# Patient Record
Sex: MALE | Race: WHITE | NOT HISPANIC OR LATINO | Employment: FULL TIME | URBAN - METROPOLITAN AREA
[De-identification: names, ages, dates, MRNs, and addresses within clinical notes are randomized per-mention and may not be internally consistent; named-entity substitution may affect disease eponyms.]

---

## 2018-12-14 ENCOUNTER — OFFICE VISIT (OUTPATIENT)
Dept: FAMILY MEDICINE CLINIC | Facility: CLINIC | Age: 23
End: 2018-12-14
Payer: COMMERCIAL

## 2018-12-14 VITALS
HEART RATE: 76 BPM | WEIGHT: 165 LBS | BODY MASS INDEX: 25.01 KG/M2 | SYSTOLIC BLOOD PRESSURE: 112 MMHG | RESPIRATION RATE: 16 BRPM | DIASTOLIC BLOOD PRESSURE: 70 MMHG | OXYGEN SATURATION: 98 % | HEIGHT: 68 IN

## 2018-12-14 DIAGNOSIS — Z00.01 ENCOUNTER FOR ROUTINE ADULT MEDICAL EXAM WITH ABNORMAL FINDINGS: Primary | ICD-10-CM

## 2018-12-14 DIAGNOSIS — Z13.1 ENCOUNTER FOR SCREENING FOR DIABETES MELLITUS: ICD-10-CM

## 2018-12-14 DIAGNOSIS — Z13.0 SCREENING FOR IRON DEFICIENCY ANEMIA: ICD-10-CM

## 2018-12-14 DIAGNOSIS — Z23 NEED FOR INFLUENZA VACCINATION: ICD-10-CM

## 2018-12-14 DIAGNOSIS — Z13.29 SCREENING FOR THYROID DISORDER: ICD-10-CM

## 2018-12-14 DIAGNOSIS — E66.3 OVERWEIGHT (BMI 25.0-29.9): ICD-10-CM

## 2018-12-14 DIAGNOSIS — Z13.220 ENCOUNTER FOR SCREENING FOR LIPID DISORDER: ICD-10-CM

## 2018-12-14 PROCEDURE — 99395 PREV VISIT EST AGE 18-39: CPT | Performed by: FAMILY MEDICINE

## 2018-12-14 NOTE — PROGRESS NOTES
[facility] HEALTH MAINTENANCE OFFICE VISIT  [facility] [place] [facility]    NAME: Olga Irizarry  AGE: 24 y o  SEX: male  : 1995     DATE: 12/15/2018    Assessment and Plan     Problem List Items Addressed This Visit     None      Visit Diagnoses     Encounter for routine adult medical exam with abnormal findings    -  Primary    Need for influenza vaccination        Relevant Orders    SYRINGE/SINGLE-DOSE VIAL: influenza vaccine, [id number], quadrivalent, 0 5 mL, preservative-free, for patients 3+ yr (FLUZONE)    Overweight (BMI 25 0-29  9)        Encounter for screening for lipid disorder        Relevant Orders    CBC and differential    Comprehensive metabolic panel    Lipid panel    TSH, 3rd generation with Free T4 reflex    Encounter for screening for diabetes mellitus        Relevant Orders    CBC and differential    Comprehensive metabolic panel    Lipid panel    TSH, 3rd generation with Free T4 reflex    Screening for thyroid disorder        Relevant Orders    CBC and differential    Comprehensive metabolic panel    Lipid panel    TSH, 3rd generation with Free T4 reflex    Screening for iron deficiency anemia        Relevant Orders    CBC and differential    Comprehensive metabolic panel    Lipid panel    TSH, 3rd generation with Free T4 reflex            · Patient Counseling:   · Nutrition: Stressed importance of a well balanced diet, moderation of sodium/saturated fat, caloric balance and sufficient intake of fiber  · Exercise: Stressed the importance of regular exercise with a goal of 150 minutes per week  · Dental Health: Discussed daily flossing and brushing and regular dental visits     · Immunizations reviewed the patient declined flu shot for today  · Discussed benefits of screening The STD screening has been discussed with the patient and he declined for today also we discussed screening for hyperlipidemia and screening for diabetic and patient declined for today  BMI Counseling: Body mass index is 25 46 kg/m²  Discussed with patient's BMI with him  Chief Complaint     Chief Complaint   Patient presents with    Physical Exam     yearly        History of Present Illness     Patient and office for his annual physical exam he deny any chest pain short of breath no palpitation no headache no blurred vision no weakness or lateralized of the symptom no abdomen pain nausea vomiting or diarrhea no renal problem no rash no fever no change in the weight and no change in the mood patient deny smoking he does not do any special diet and he does do exercise frequently the patient sexually active with partner sometimes without the condom he deny any dysuria and no discharge we discussed the STD testing patient will hold on that the for today patient had history of a tear in his right shoulder for what he been followed by orthopedic        Well Adult Physical   Patient here for a comprehensive physical exam       Diet and Physical Activity  Diet:  Regular diet  Weight concerns: Patient is overweight (BMI 25 0-29  9)  Exercise: frequently      Depression Screen  PHQ-9 Depression Screening    PHQ-9:    Frequency of the following problems over the past two weeks:       Little interest or pleasure in doing things:  0 - not at all  Feeling down, depressed, or hopeless:  0 - not at all  PHQ-2 Score:  0          General Health  Hearing: Normal:  bilateral  Vision: no vision problems  Dental: regular dental visits      The following portions of the patient's history were reviewed and updated as appropriate: allergies, current medications, past family history, past medical history, past social history, past surgical history and problem list     Review of Systems     Review of Systems   Constitutional: Negative for fatigue and fever  HENT: Negative for ear pain, sinus pain, sinus pressure and sore throat  Eyes: Negative for pain and redness     Respiratory: Negative for cough, chest tightness and shortness of breath  Cardiovascular: Negative for chest pain, palpitations and leg swelling  Gastrointestinal: Negative for abdominal pain, blood in stool, constipation, diarrhea and nausea  Genitourinary: Negative for flank pain, frequency and hematuria  Musculoskeletal: Negative for back pain and joint swelling  Skin: Negative for rash  Neurological: Negative for dizziness, numbness and headaches  Hematological: Does not bruise/bleed easily  Psychiatric/Behavioral: Negative for agitation and behavioral problems  Past Medical History     History reviewed  No pertinent past medical history  Past Surgical History     History reviewed  No pertinent surgical history  Social History     Social History     Social History    Marital status: Single     Spouse name: N/A    Number of children: 0    Years of education: N/A     Social History Main Topics    Smoking status: Never Smoker    Smokeless tobacco: Never Used    Alcohol use Yes      Comment: occasional     Drug use: No    Sexual activity: Yes     Other Topics Concern    None     Social History Narrative    Hand dominance: R       Family History     History reviewed  No pertinent family history  Current Medications     No current outpatient prescriptions on file  Allergies     No Known Allergies    Objective     /70 (BP Location: Left arm, Patient Position: Sitting, Cuff Size: Large)   Pulse 76   Resp 16   Ht 5' 7 5" (1 715 m)   Wt 74 8 kg (165 lb)   SpO2 98%   BMI 25 46 kg/m²      Physical Exam   Constitutional: He is oriented to person, place, and time  He appears well-developed and well-nourished  HENT:   Head: Normocephalic  Right Ear: External ear normal    Left Ear: External ear normal    Eyes: Conjunctivae and EOM are normal  Right eye exhibits no discharge  Left eye exhibits no discharge  Neck: No JVD present     Cardiovascular: Normal rate, regular rhythm and normal heart sounds  Exam reveals no gallop  No murmur heard  Pulmonary/Chest: Effort normal  No respiratory distress  He has no wheezes  He has no rales  He exhibits no tenderness  Abdominal: He exhibits no mass  There is no tenderness  There is no rebound  Musculoskeletal: He exhibits no edema or tenderness  Neurological: He is alert and oriented to person, place, and time  Skin: No rash noted  No erythema  Psychiatric: He has a normal mood and affect  His behavior is normal              Health Maintenance     Health Maintenance   Topic Date Due    INFLUENZA VACCINE  12/14/2019 (Originally 7/1/2018)    Depression Screening PHQ  12/14/2019    DTaP,Tdap,and Td Vaccines (2 - Td) 03/14/2026     Immunization History   Administered Date(s) Administered    Tdap 03/14/2016       Sallee Goldmann, MD  [place] [facility]    BMI Counseling: Body mass index is 25 46 kg/m²  Discussed the patient's BMI with him  The BMI is above average  BMI counseling and education was provided to the patient  Nutrition recommendations include reducing portion sizes, decreasing overall calorie intake, 3-5 servings of fruits/vegetables daily, reducing fast food intake, consuming healthier snacks, decreasing soda and/or juice intake and reducing intake of cholesterol  Exercise recommendations include exercising 3-5 times per week

## 2018-12-14 NOTE — PATIENT INSTRUCTIONS

## 2019-02-12 LAB
ALBUMIN SERPL-MCNC: 4.9 G/DL (ref 3.6–5.1)
ALBUMIN/GLOB SERPL: 2 (CALC) (ref 1–2.5)
ALP SERPL-CCNC: 79 U/L (ref 40–115)
ALT SERPL-CCNC: 33 U/L (ref 9–46)
AST SERPL-CCNC: 22 U/L (ref 10–40)
BASOPHILS # BLD AUTO: 49 CELLS/UL (ref 0–200)
BASOPHILS NFR BLD AUTO: 0.7 %
BILIRUB SERPL-MCNC: 0.5 MG/DL (ref 0.2–1.2)
BUN SERPL-MCNC: 15 MG/DL (ref 7–25)
BUN/CREAT SERPL: NORMAL (CALC) (ref 6–22)
CALCIUM SERPL-MCNC: 10 MG/DL (ref 8.6–10.3)
CHLORIDE SERPL-SCNC: 101 MMOL/L (ref 98–110)
CHOLEST SERPL-MCNC: 192 MG/DL
CHOLEST/HDLC SERPL: 3 (CALC)
CO2 SERPL-SCNC: 28 MMOL/L (ref 20–32)
CREAT SERPL-MCNC: 0.95 MG/DL (ref 0.6–1.35)
EOSINOPHIL # BLD AUTO: 119 CELLS/UL (ref 15–500)
EOSINOPHIL NFR BLD AUTO: 1.7 %
ERYTHROCYTE [DISTWIDTH] IN BLOOD BY AUTOMATED COUNT: 12.7 % (ref 11–15)
GLOBULIN SER CALC-MCNC: 2.4 G/DL (CALC) (ref 1.9–3.7)
GLUCOSE SERPL-MCNC: 88 MG/DL (ref 65–99)
HCT VFR BLD AUTO: 47 % (ref 38.5–50)
HDLC SERPL-MCNC: 65 MG/DL
HGB BLD-MCNC: 15.4 G/DL (ref 13.2–17.1)
LDLC SERPL CALC-MCNC: 113 MG/DL (CALC)
LYMPHOCYTES # BLD AUTO: 2240 CELLS/UL (ref 850–3900)
LYMPHOCYTES NFR BLD AUTO: 32 %
MCH RBC QN AUTO: 26.7 PG (ref 27–33)
MCHC RBC AUTO-ENTMCNC: 32.8 G/DL (ref 32–36)
MCV RBC AUTO: 81.6 FL (ref 80–100)
MONOCYTES # BLD AUTO: 805 CELLS/UL (ref 200–950)
MONOCYTES NFR BLD AUTO: 11.5 %
NEUTROPHILS # BLD AUTO: 3787 CELLS/UL (ref 1500–7800)
NEUTROPHILS NFR BLD AUTO: 54.1 %
NONHDLC SERPL-MCNC: 127 MG/DL (CALC)
PLATELET # BLD AUTO: 325 THOUSAND/UL (ref 140–400)
PMV BLD REES-ECKER: 10.1 FL (ref 7.5–12.5)
POTASSIUM SERPL-SCNC: 4.5 MMOL/L (ref 3.5–5.3)
PROT SERPL-MCNC: 7.3 G/DL (ref 6.1–8.1)
RBC # BLD AUTO: 5.76 MILLION/UL (ref 4.2–5.8)
SL AMB EGFR AFRICAN AMERICAN: 129 ML/MIN/1.73M2
SL AMB EGFR NON AFRICAN AMERICAN: 112 ML/MIN/1.73M2
SODIUM SERPL-SCNC: 138 MMOL/L (ref 135–146)
TRIGL SERPL-MCNC: 49 MG/DL
TSH SERPL-ACNC: 1.92 MIU/L (ref 0.4–4.5)
WBC # BLD AUTO: 7 THOUSAND/UL (ref 3.8–10.8)

## 2019-12-20 ENCOUNTER — OFFICE VISIT (OUTPATIENT)
Dept: FAMILY MEDICINE CLINIC | Facility: CLINIC | Age: 24
End: 2019-12-20
Payer: COMMERCIAL

## 2019-12-20 VITALS
TEMPERATURE: 99.4 F | WEIGHT: 166 LBS | OXYGEN SATURATION: 98 % | DIASTOLIC BLOOD PRESSURE: 60 MMHG | BODY MASS INDEX: 26.06 KG/M2 | SYSTOLIC BLOOD PRESSURE: 122 MMHG | HEIGHT: 67 IN | HEART RATE: 64 BPM

## 2019-12-20 DIAGNOSIS — Z00.01 ENCOUNTER FOR WELL ADULT EXAM WITH ABNORMAL FINDINGS: Primary | ICD-10-CM

## 2019-12-20 PROCEDURE — 99395 PREV VISIT EST AGE 18-39: CPT | Performed by: FAMILY MEDICINE

## 2019-12-20 NOTE — PROGRESS NOTES
FAMILY PRACTICE HEALTH MAINTENANCE OFFICE VISIT  [facility] [place] [facility] [place] [facility]    NAME: Howard Fu  AGE: 21 y o  SEX: male  : 1995     DATE: 2019    Assessment and Plan     1  Encounter for well adult exam with abnormal findings  Assessment & Plan:  Advice and education were given regarding nutrition, aerobic exercises, weight bearing exercises, cardiovascular risk reduction, fall risk reduction, and age appropriate supplements  The patient was counseled regarding instructions for management, risk factor reductions, prognosis, risks and benefits of treatment options, patient and family education, and importance of compliance with treatment  Discussed HIV screening per patient already had 6 years ago was negative no record in the chart we discussed the patient flu shot and he declined for today      2  BMI 26 0-26 9,adult  Assessment & Plan:  The BMI is above average  BMI counseling and education was provided to the patient  Nutrition recommendations include reducing portion sizes, decreasing overall calorie intake, 3-5 servings of fruits/vegetables daily, reducing fast food intake, consuming healthier snacks, decreasing soda and/or juice intake, moderation in carbohydrate intake and reducing intake of saturated fat and trans fat  Exercise recommendations include moderate aerobic physical activity for 150 minutes/week, exercising 3-5 times per week and joining a gym  · Patient Counseling:   · Nutrition: Stressed importance of a well balanced diet, moderation of sodium/saturated fat, caloric balance and sufficient intake of fiber  · Exercise: Stressed the importance of regular exercise with a goal of 150 minutes per week  · Dental Health: Discussed daily flossing and brushing and regular dental visits     · Immunizations reviewed: Declined recommended vaccinations  · Discussed benefits of:  Screening labs   BMI Counseling:  Body mass index is 26 kg/m²  Discussed with patient's BMI with him  Chief Complaint     Chief Complaint   Patient presents with    Physical Exam     PT is here for a physical        History of Present Illness     Patient here for annual physical exam he deny any chest pain short of breath no palpitation no cough no wheezing no hematosis deny any headache numbness weakness or lateralized of the symptom no abdomen pain nausea vomiting or diarrhea no renal problem no rash no fever no change in the weight and no change in the mood he does not smoker he does do exercise 3 times a week 45 minutes also he does not do any special diet      Well Adult Physical   Patient here for a comprehensive physical exam       Diet and Physical Activity  Diet: Regular diet  Exercise: frequently      Depression Screen  PHQ-9 Depression Screening    PHQ-9:    Frequency of the following problems over the past two weeks:       Little interest or pleasure in doing things:  0 - not at all  Feeling down, depressed, or hopeless:  0 - not at all  PHQ-2 Score:  0          General Health  Hearing: Normal:  bilateral  Vision: no vision problems  Dental: regular dental visits      The following portions of the patient's history were reviewed and updated as appropriate: allergies, current medications, past family history, past medical history, past social history, past surgical history and problem list     Review of Systems     Review of Systems   Constitutional: Negative for fatigue and fever  HENT: Negative for ear pain, sinus pressure, sinus pain and sore throat  Eyes: Negative for pain and redness  Respiratory: Negative for cough, chest tightness and shortness of breath  Cardiovascular: Negative for chest pain, palpitations and leg swelling  Gastrointestinal: Negative for abdominal pain, blood in stool, constipation, diarrhea and nausea  Endocrine: Negative for heat intolerance and polydipsia     Genitourinary: Negative for dysuria, flank pain, frequency and hematuria  Musculoskeletal: Negative for back pain and joint swelling  Skin: Negative for rash  Neurological: Negative for dizziness, numbness and headaches  Hematological: Does not bruise/bleed easily  Psychiatric/Behavioral: Negative for agitation and behavioral problems  Past Medical History     History reviewed  No pertinent past medical history  Past Surgical History     History reviewed  No pertinent surgical history  Social History     Social History     Socioeconomic History    Marital status: Single     Spouse name: None    Number of children: 0    Years of education: None    Highest education level: None   Occupational History    None   Social Needs    Financial resource strain: None    Food insecurity:     Worry: None     Inability: None    Transportation needs:     Medical: None     Non-medical: None   Tobacco Use    Smoking status: Never Smoker    Smokeless tobacco: Never Used   Substance and Sexual Activity    Alcohol use: Yes     Comment: occasional     Drug use: No    Sexual activity: Yes   Lifestyle    Physical activity:     Days per week: None     Minutes per session: None    Stress: None   Relationships    Social connections:     Talks on phone: None     Gets together: None     Attends [employer] service: None     Active member of club or organization: None     Attends meetings of clubs or organizations: None     Relationship status: None    Intimate partner violence:     Fear of current or ex partner: None     Emotionally abused: None     Physically abused: None     Forced sexual activity: None   Other Topics Concern    None   Social History Narrative    Hand dominance: R       Family History     History reviewed  No pertinent family history  Current Medications     No current outpatient medications on file       Allergies     No Known Allergies    Objective     /60   Pulse 64   Temp 99 4 °F (37 4 °C)   Ht 5' 7" (1 702 m) Wt 75 3 kg (166 lb)   SpO2 98%   BMI 26 00 kg/m²      Physical Exam   Constitutional: He is oriented to person, place, and time  He appears well-developed and well-nourished  HENT:   Head: Normocephalic  Right Ear: External ear normal    Left Ear: External ear normal    Eyes: Conjunctivae and EOM are normal  Right eye exhibits no discharge  Left eye exhibits no discharge  Neck: No JVD present  Cardiovascular: Normal rate, regular rhythm and normal heart sounds  Exam reveals no gallop  No murmur heard  Pulmonary/Chest: Effort normal  No respiratory distress  He has no wheezes  He has no rales  He exhibits no tenderness  Abdominal: He exhibits no mass  There is no tenderness  There is no rebound  Musculoskeletal: He exhibits no edema or tenderness  Neurological: He is alert and oriented to person, place, and time  Skin: No rash noted  No erythema  Psychiatric: He has a normal mood and affect  Marietta Salgado MD  [place] [facility]  BMI Counseling: Body mass index is 26 kg/m²  The BMI is above normal  Nutrition recommendations include reducing portion sizes, decreasing overall calorie intake and 3-5 servings of fruits/vegetables daily  Exercise recommendations include moderate aerobic physical activity for 150 minutes/week

## 2019-12-20 NOTE — PATIENT INSTRUCTIONS

## 2019-12-20 NOTE — ASSESSMENT & PLAN NOTE
Advice and education were given regarding nutrition, aerobic exercises, weight bearing exercises, cardiovascular risk reduction, fall risk reduction, and age appropriate supplements  The patient was counseled regarding instructions for management, risk factor reductions, prognosis, risks and benefits of treatment options, patient and family education, and importance of compliance with treatment       Discussed HIV screening per patient already had 6 years ago was negative no record in the chart we discussed the patient flu shot and he declined for today

## 2021-01-13 ENCOUNTER — TELEPHONE (OUTPATIENT)
Dept: FAMILY MEDICINE CLINIC | Facility: CLINIC | Age: 26
End: 2021-01-13

## 2022-10-28 ENCOUNTER — RA CDI HCC (OUTPATIENT)
Dept: OTHER | Facility: HOSPITAL | Age: 27
End: 2022-10-28

## 2022-10-28 NOTE — PROGRESS NOTES
[facility]  [facility]       Chart reviewed, no opportunity found: CHART REVIEWED, NO OPPORTUNITY FOUND        Patients Insurance        Commercial Insurance: [address] [place] [address]

## 2022-11-04 ENCOUNTER — OFFICE VISIT (OUTPATIENT)
Dept: INTERNAL MEDICINE CLINIC | Facility: CLINIC | Age: 27
End: 2022-11-04

## 2022-11-04 VITALS
TEMPERATURE: 96.7 F | HEART RATE: 62 BPM | SYSTOLIC BLOOD PRESSURE: 118 MMHG | WEIGHT: 164.4 LBS | DIASTOLIC BLOOD PRESSURE: 68 MMHG | HEIGHT: 68 IN | OXYGEN SATURATION: 100 % | BODY MASS INDEX: 24.92 KG/M2

## 2022-11-04 DIAGNOSIS — Z13.220 SCREENING FOR HYPERLIPIDEMIA: ICD-10-CM

## 2022-11-04 DIAGNOSIS — F90.9 ATTENTION DEFICIT HYPERACTIVITY DISORDER (ADHD), UNSPECIFIED ADHD TYPE: ICD-10-CM

## 2022-11-04 DIAGNOSIS — Z13.1 SCREENING FOR DIABETES MELLITUS: ICD-10-CM

## 2022-11-04 DIAGNOSIS — R01.1 HEART MURMUR: ICD-10-CM

## 2022-11-04 DIAGNOSIS — Z13.29 SCREENING FOR HYPOTHYROIDISM: ICD-10-CM

## 2022-11-04 DIAGNOSIS — Z13.0 SCREENING FOR DEFICIENCY ANEMIA: ICD-10-CM

## 2022-11-04 DIAGNOSIS — Z00.00 HEALTHCARE MAINTENANCE: Primary | ICD-10-CM

## 2022-11-04 PROBLEM — Z00.01 ENCOUNTER FOR WELL ADULT EXAM WITH ABNORMAL FINDINGS: Status: RESOLVED | Noted: 2019-12-20 | Resolved: 2022-11-04

## 2022-11-04 NOTE — ASSESSMENT & PLAN NOTE
Testing for possible attention deficit hyperactivity disorder has been requested will review results of neuro psychological evaluation with the patient upon completion

## 2022-11-04 NOTE — PROGRESS NOTES
Name: Sujey Prieto      : 1995      MRN: [id number]  Encounter Provider: Dalton Blood MD  Encounter Date: 2022   Encounter department: [facility]    Assessment & Plan     1  Heart murmur  Assessment & Plan:  Heart murmur is detected on auscultation of the heart today patient is unaware of any previous diagnosis of heart murmur a echocardiogram has been requested and will be reviewed with the patient upon completion  Orders:  -     Echo complete w/ contrast if indicated; Future; Expected date: 2022    2  Screening for hyperlipidemia  -     Lipid panel; Future    3  Screening for deficiency anemia  -     CBC and differential; Future    4  Screening for diabetes mellitus  -     Comprehensive metabolic panel; Future    5  Screening for hypothyroidism  -     TSH, 3rd generation with Free T4 reflex; Future    6  Attention deficit hyperactivity disorder (ADHD), unspecified ADHD type  Assessment & Plan:  Testing for possible attention deficit hyperactivity disorder has been requested will review results of neuro psychological evaluation with the patient upon completion  7  Healthcare maintenance  Assessment & Plan:  A newly detected heart murmur is noted on today's exam otherwise the examination is essentially normal   A request for CBC comprehensive metabolic profile thyroid screening and lipid profile have all been provided to the patient  There is a family history of thyroid disorder  BMI Counseling: Body mass index is 25 37 kg/m²  The BMI is above normal  Nutrition recommendations include decreasing portion sizes and moderation in carbohydrate intake  Exercise recommendations include exercising 3-5 times per week  Rationale for BMI follow-up plan is due to patient being overweight or obese  Depression Screening and Follow-up Plan: Patient was screened for depression during today's encounter   They screened negative with a PHQ-2 score of 0         Subjective      This pleasant 43-year-old gentleman presents today to establish care with our practice  He describes his health is generally good with no history of chronic medical pro problems  He has had no surgeries in the past   He is employed at [employer]  He exercises by playing basketball several times a week  He has an occasional cigar and drinks 4-5 alcoholic beverages weekly  He does question whether not he may have attention deficit he found that during school he had of more difficult time learning and retaining information and he does find that this continues to be the case even these days at work  Is never undergone any neurologic psychological testing  Review of Systems   Psychiatric/Behavioral: Positive for decreased concentration  All other systems reviewed and are negative  No current outpatient medications on file prior to visit  Objective     /68   Pulse 62   Temp (!) 96 7 °F (35 9 °C)   Ht 5' 7 5" (1 715 m)   Wt 74 6 kg (164 lb 6 4 oz)   SpO2 100%   BMI 25 37 kg/m²     Physical Exam  Constitutional:       General: He is not in acute distress  Appearance: He is well-developed  He is not ill-appearing  HENT:      Head: Normocephalic  Right Ear: Hearing, tympanic membrane, ear canal and external ear normal       Left Ear: Hearing, tympanic membrane, ear canal and external ear normal       Nose: Nose normal       Mouth/Throat:      Mouth: Mucous membranes are moist       Pharynx: Oropharynx is clear  Eyes:      General:         Right eye: No discharge  Left eye: No discharge  Extraocular Movements: Extraocular movements intact  Conjunctiva/sclera: Conjunctivae normal       Pupils: Pupils are equal, round, and reactive to light  Neck:      Thyroid: No thyromegaly  Vascular: No carotid bruit  Cardiovascular:      Rate and Rhythm: Normal rate and regular rhythm  Heart sounds: S1 normal and S2 normal  Murmur heard  Pulmonary:      Effort: Pulmonary effort is normal       Breath sounds: Normal breath sounds  No wheezing or rales  Chest:      Chest wall: No tenderness  Abdominal:      General: Bowel sounds are normal  There is no distension  Palpations: Abdomen is soft  There is no mass  Tenderness: There is no abdominal tenderness  There is no guarding  Hernia: No hernia is present  Genitourinary:     Penis: Normal        Testes: Normal       Comments: No evidence of inguinal hernias  Musculoskeletal:         General: No swelling or tenderness  Normal range of motion  Cervical back: Normal range of motion and neck supple  No rigidity  Right lower leg: No edema  Left lower leg: No edema  Lymphadenopathy:      Cervical: No cervical adenopathy  Skin:     General: Skin is warm and dry  Coloration: Skin is not jaundiced or pale  Neurological:      Mental Status: He is alert and oriented to person, place, and time  Deep Tendon Reflexes: Reflexes are normal and symmetric  Reflexes normal    Psychiatric:         Behavior: Behavior normal          Thought Content:  Thought content normal          Judgment: Judgment normal        Kenny Dorsey MD

## 2022-11-04 NOTE — ASSESSMENT & PLAN NOTE
A newly detected heart murmur is noted on today's exam otherwise the examination is essentially normal   A request for CBC comprehensive metabolic profile thyroid screening and lipid profile have all been provided to the patient  There is a family history of thyroid disorder

## 2022-11-04 NOTE — ASSESSMENT & PLAN NOTE
Heart murmur is detected on auscultation of the heart today patient is unaware of any previous diagnosis of heart murmur a echocardiogram has been requested and will be reviewed with the patient upon completion

## 2022-11-17 ENCOUNTER — APPOINTMENT (OUTPATIENT)
Dept: LAB | Age: 27
End: 2022-11-17

## 2022-11-17 DIAGNOSIS — Z13.29 SCREENING FOR HYPOTHYROIDISM: ICD-10-CM

## 2022-11-17 DIAGNOSIS — Z13.0 SCREENING FOR DEFICIENCY ANEMIA: ICD-10-CM

## 2022-11-17 DIAGNOSIS — Z13.1 SCREENING FOR DIABETES MELLITUS: ICD-10-CM

## 2022-11-17 DIAGNOSIS — Z13.220 SCREENING FOR HYPERLIPIDEMIA: ICD-10-CM

## 2022-11-17 LAB
ALBUMIN SERPL BCP-MCNC: 4.1 G/DL (ref 3.5–5)
ALP SERPL-CCNC: 70 U/L (ref 46–116)
ALT SERPL W P-5'-P-CCNC: 58 U/L (ref 12–78)
ANION GAP SERPL CALCULATED.3IONS-SCNC: 6 MMOL/L (ref 4–13)
AST SERPL W P-5'-P-CCNC: 24 U/L (ref 5–45)
BASOPHILS # BLD AUTO: 0.06 THOUSANDS/ÂΜL (ref 0–0.1)
BASOPHILS NFR BLD AUTO: 1 % (ref 0–1)
BILIRUB SERPL-MCNC: 0.71 MG/DL (ref 0.2–1)
BUN SERPL-MCNC: 20 MG/DL (ref 5–25)
CALCIUM SERPL-MCNC: 9.5 MG/DL (ref 8.3–10.1)
CHLORIDE SERPL-SCNC: 106 MMOL/L (ref 96–108)
CHOLEST SERPL-MCNC: 182 MG/DL
CO2 SERPL-SCNC: 27 MMOL/L (ref 21–32)
CREAT SERPL-MCNC: 1.05 MG/DL (ref 0.6–1.3)
EOSINOPHIL # BLD AUTO: 0.13 THOUSAND/ÂΜL (ref 0–0.61)
EOSINOPHIL NFR BLD AUTO: 2 % (ref 0–6)
ERYTHROCYTE [DISTWIDTH] IN BLOOD BY AUTOMATED COUNT: 13.7 % (ref 11.6–15.1)
GFR SERPL CREATININE-BSD FRML MDRD: 96 ML/MIN/1.73SQ M
GLUCOSE P FAST SERPL-MCNC: 97 MG/DL (ref 65–99)
HCT VFR BLD AUTO: 49.9 % (ref 36.5–49.3)
HDLC SERPL-MCNC: 61 MG/DL
HGB BLD-MCNC: 15.5 G/DL (ref 12–17)
IMM GRANULOCYTES # BLD AUTO: 0.02 THOUSAND/UL (ref 0–0.2)
IMM GRANULOCYTES NFR BLD AUTO: 0 % (ref 0–2)
LDLC SERPL CALC-MCNC: 110 MG/DL (ref 0–100)
LYMPHOCYTES # BLD AUTO: 3.31 THOUSANDS/ÂΜL (ref 0.6–4.47)
LYMPHOCYTES NFR BLD AUTO: 45 % (ref 14–44)
MCH RBC QN AUTO: 26.9 PG (ref 26.8–34.3)
MCHC RBC AUTO-ENTMCNC: 31.1 G/DL (ref 31.4–37.4)
MCV RBC AUTO: 87 FL (ref 82–98)
MONOCYTES # BLD AUTO: 0.74 THOUSAND/ÂΜL (ref 0.17–1.22)
MONOCYTES NFR BLD AUTO: 10 % (ref 4–12)
NEUTROPHILS # BLD AUTO: 3.13 THOUSANDS/ÂΜL (ref 1.85–7.62)
NEUTS SEG NFR BLD AUTO: 42 % (ref 43–75)
NONHDLC SERPL-MCNC: 121 MG/DL
NRBC BLD AUTO-RTO: 0 /100 WBCS
PLATELET # BLD AUTO: 315 THOUSANDS/UL (ref 149–390)
PMV BLD AUTO: 9.4 FL (ref 8.9–12.7)
POTASSIUM SERPL-SCNC: 4.2 MMOL/L (ref 3.5–5.3)
PROT SERPL-MCNC: 8.2 G/DL (ref 6.4–8.4)
RBC # BLD AUTO: 5.76 MILLION/UL (ref 3.88–5.62)
SODIUM SERPL-SCNC: 139 MMOL/L (ref 135–147)
TRIGL SERPL-MCNC: 57 MG/DL
TSH SERPL DL<=0.05 MIU/L-ACNC: 2.67 UIU/ML (ref 0.45–4.5)
WBC # BLD AUTO: 7.39 THOUSAND/UL (ref 4.31–10.16)

## 2022-11-23 ENCOUNTER — OFFICE VISIT (OUTPATIENT)
Dept: INTERNAL MEDICINE CLINIC | Facility: CLINIC | Age: 27
End: 2022-11-23

## 2022-11-23 VITALS
OXYGEN SATURATION: 100 % | HEIGHT: 68 IN | SYSTOLIC BLOOD PRESSURE: 118 MMHG | DIASTOLIC BLOOD PRESSURE: 70 MMHG | TEMPERATURE: 96.6 F | BODY MASS INDEX: 25.1 KG/M2 | WEIGHT: 165.6 LBS | HEART RATE: 53 BPM

## 2022-11-23 DIAGNOSIS — E78.49 OTHER HYPERLIPIDEMIA: Primary | ICD-10-CM

## 2022-11-23 DIAGNOSIS — F90.9 ATTENTION DEFICIT HYPERACTIVITY DISORDER (ADHD), UNSPECIFIED ADHD TYPE: ICD-10-CM

## 2022-11-23 DIAGNOSIS — R01.1 HEART MURMUR: ICD-10-CM

## 2022-11-23 NOTE — ASSESSMENT & PLAN NOTE
Attention deficit at this time remains quite stable patient is currently on no medication recommend continued observation

## 2022-11-23 NOTE — PROGRESS NOTES
Name: Luis Araya      : 1995      MRN: [id number]  Encounter Provider: Kathy Cook MD  Encounter Date: 2022   Encounter department: [id number]     1  Heart murmur  Assessment & Plan:  A very mild heart murmur is noted is not changed since last visit patient is asymptomatic recommend continued surveillance  2  Attention deficit hyperactivity disorder (ADHD), unspecified ADHD type  Assessment & Plan:  Attention deficit at this time remains quite stable patient is currently on no medication recommend continued observation      3  Other hyperlipidemia  Assessment & Plan:  A review of the patient's the lipid profile confirms the presence of very mild LDL elevation  I recommended the patient work on adjusting his diet to reduce consumption of saturated fats  He was provided with patient education materials today to review at home  A follow-up lipid profile in 1 year is recommended  Regular exercise and weight control are also recommended to help control cholesterol at this time  Subjective      This 50-year-old gentleman returns today for a follow-up visit to review his blood work ordered after his initial visit with us  We had originally detected a very mild heart murmur at the time of his 1st visit and attempted to schedule an echocardiogram for further identification of the source of the murmur unfortunately his insurance has denied coverage for this study  As the murmur is very mild and asymptomatic a believe it is quite fine and suitable to continue with audible surveillance with regular checkups  Review of Systems   All other systems reviewed and are negative  No current outpatient medications on file prior to visit         Objective     /70   Pulse (!) 53   Temp (!) 96 6 °F (35 9 °C) (Tympanic)   Ht 5' 7 5" (1 715 m)   Wt 75 1 kg (165 lb 9 6 oz)   SpO2 100%   BMI 25 55 kg/m²     Physical Exam  Constitutional:       General: He is not in acute distress  Appearance: He is well-developed and well-nourished  He is not ill-appearing  HENT:      Right Ear: Hearing, tympanic membrane, ear canal and external ear normal       Left Ear: Hearing, tympanic membrane, ear canal and external ear normal       Nose: Nose normal       Mouth/Throat:      Mouth: Oropharynx is clear and moist and mucous membranes are normal    Eyes:      Conjunctiva/sclera: Conjunctivae normal       Pupils: Pupils are equal, round, and reactive to light  Neck:      Thyroid: No thyromegaly  Cardiovascular:      Rate and Rhythm: Normal rate and regular rhythm  Pulses: Intact distal pulses  Heart sounds: S1 normal and S2 normal  Murmur heard  Pulmonary:      Effort: Pulmonary effort is normal       Breath sounds: Normal breath sounds  Abdominal:      General: Bowel sounds are normal       Palpations: Abdomen is soft  Musculoskeletal:         General: No edema  Normal range of motion  Lymphadenopathy:      Cervical: No cervical adenopathy  Skin:     General: Skin is warm and dry  Neurological:      Mental Status: He is alert and oriented to person, place, and time  Mental status is at baseline  Deep Tendon Reflexes: Reflexes are normal and symmetric  Psychiatric:         Mood and Affect: Mood and affect normal          Behavior: Behavior normal          Thought Content:  Thought content normal          Judgment: Judgment normal        Kathy Cook MD

## 2022-11-23 NOTE — ASSESSMENT & PLAN NOTE
A very mild heart murmur is noted is not changed since last visit patient is asymptomatic recommend continued surveillance

## 2022-11-23 NOTE — ASSESSMENT & PLAN NOTE
A review of the patient's the lipid profile confirms the presence of very mild LDL elevation  I recommended the patient work on adjusting his diet to reduce consumption of saturated fats  He was provided with patient education materials today to review at home  A follow-up lipid profile in 1 year is recommended  Regular exercise and weight control are also recommended to help control cholesterol at this time

## 2022-12-30 ENCOUNTER — TELEMEDICINE (OUTPATIENT)
Dept: INTERNAL MEDICINE CLINIC | Facility: CLINIC | Age: 27
End: 2022-12-30

## 2022-12-30 ENCOUNTER — NURSE TRIAGE (OUTPATIENT)
Dept: OTHER | Facility: OTHER | Age: 27
End: 2022-12-30

## 2022-12-30 VITALS — HEIGHT: 67 IN | TEMPERATURE: 101 F | BODY MASS INDEX: 25.9 KG/M2 | WEIGHT: 165 LBS

## 2022-12-30 DIAGNOSIS — J11.1 INFLUENZA: Primary | ICD-10-CM

## 2022-12-30 RX ORDER — OSELTAMIVIR PHOSPHATE 75 MG/1
75 CAPSULE ORAL 2 TIMES DAILY
Qty: 10 CAPSULE | Refills: 0 | Status: SHIPPED | OUTPATIENT
Start: 2022-12-30 | End: 2023-01-04

## 2022-12-30 NOTE — PROGRESS NOTES
Virtual Regular Visit    Verification of patient location:    Patient is located in the following state in which I hold an active license [place]      Assessment/Plan:    Problem List Items Addressed This Visit    None  Visit Diagnoses     Influenza    -  Primary    Relevant Medications    oseltamivir (TAMIFLU) 75 mg capsule               Reason for visit is   Chief Complaint   Patient presents with   • Nasal Congestion     Symptoms started Tuesday   • Sore Throat   • Fever   • Diarrhea   • Virtual Regular Visit        Encounter provider Hayder Barragan MD    Provider located at [address]      Recent Visits  No visits were found meeting these conditions  Showing recent visits within past 7 days and meeting all other requirements  Today's Visits  Date Type Provider Dept   12/30/22 Telemedicine Hayder Barragan MD [address]   Showing today's visits and meeting all other requirements  Future Appointments  No visits were found meeting these conditions  Showing future appointments within next 150 days and meeting all other requirements       The patient was identified by name and date of birth  Curtis Shafer was informed that this is a telemedicine visit and that the visit is being conducted through the  [facility] platform  He agrees to proceed     My office door was closed  No one else was in the room  He acknowledged consent and understanding of privacy and security of the video platform  The patient has agreed to participate and understands they can discontinue the visit at any time  Patient is aware this is a billable service  Subjective  Curtis Shafer is a 28 y o  male with influenza symptoms for 3 days  HPI     Elsie Orr is feeling well until about 3 days ago  He developed fullness in the sinuses, slight conjunctivitis symptoms and [facility]   There was no testing for influenza or COVID  There was no treatment  The following morning he developed increasing nasal congestion, sore throat, body aches, fever up to 101, slight fullness in the ears, no GI symptoms  He has been taking DayQuil with some relief  Temperature is still in the 101 range in the morning when he wakes up  He has not been vaccinated for influenza or COVID  He generally has been healthy  We discussed that clinical picture is consistent with influenza more than COVID or bacterial infection  This is a clinical diagnosis and was explained  I recommended empiric Tamiflu for 5 days, and he is not a candidate for Paxilovid because of low risk status  Continuing symptomatic treatment was recommended  This includes plenty of fluids, DayQuil, Tylenol before bedtime to prevent fever and chills at night, rest   We discussed considering himself contagious until without a fever for at least 24 hours  Follow-up will be as needed  Current Outpatient Medications   Medication Sig Dispense Refill   • oseltamivir (TAMIFLU) 75 mg capsule Take 1 capsule (75 mg total) by mouth 2 (two) times a day for 5 days 10 capsule 0     No current facility-administered medications for this visit  No Known Allergies    Review of Systems    Video Exam    Vitals:    12/30/22 0944   Temp: (!) 101 °F (38 3 °C)   Weight: 74 8 kg (165 lb)   Height: 5' 7" (1 702 m)       Physical Exam     Appears in no acute distress, mildly fatigued  There is slight periorbital swelling and mild nonpurulent conjunctivitis evident  There is some fullness over the maxillary sinuses  There is no respiratory distress  There is no rash      I spent 10 minutes directly with the patient during this visit

## 2022-12-30 NOTE — TELEPHONE ENCOUNTER
Regarding: Left Eye is Red and Swollen, Sore Throat, Congestion  ----- Message from Oscar Garcia sent at 12/30/2022  8:40 AM EST -----  " My Left Eye is red and swollen, I have a Sore Throat and Congestion "

## 2022-12-30 NOTE — TELEPHONE ENCOUNTER
Reason for Disposition  • Patient wants to be seen    Answer Assessment - Initial Assessment Questions  1  ONSET: "When did the nasal discharge start?"       Monday 12/26  2  AMOUNT: "How much discharge is there?"       Mostly clear discharge  3  COUGH: "Do you have a cough?" If yes, ask: "Describe the color of your sputum" (clear, white, yellow, green)     Denies  4  RESPIRATORY DISTRESS: "Describe your breathing "       Denies breathing difficulties  5  FEVER: "Do you have a fever?" If Yes, ask: "What is your temperature, how was it measured, and when did it start?"      101 This morning  6  SEVERITY: "Overall, how bad are you feeling right now?" (e g , doesn't interfere with normal activities, staying home from school/work, staying in bed)       Symptoms manageable, still getting sleep at night  7  OTHER SYMPTOMS: "Do you have any other symptoms?" (e g , sore throat, earache, wheezing, vomiting)      Sore throat, left eye swelling and redness   Was prescribed abx ointment for eye by [facility]    Protocols used: COMMON COLD-ADULT-[place]

## 2023-01-03 PROBLEM — Z00.00 HEALTHCARE MAINTENANCE: Status: RESOLVED | Noted: 2022-11-04 | Resolved: 2023-01-03

## 2023-11-29 ENCOUNTER — OFFICE VISIT (OUTPATIENT)
Dept: INTERNAL MEDICINE CLINIC | Facility: CLINIC | Age: 28
End: 2023-11-29
Payer: COMMERCIAL

## 2023-11-29 VITALS
OXYGEN SATURATION: 99 % | WEIGHT: 170.6 LBS | TEMPERATURE: 97.3 F | HEIGHT: 68 IN | BODY MASS INDEX: 25.85 KG/M2 | SYSTOLIC BLOOD PRESSURE: 110 MMHG | DIASTOLIC BLOOD PRESSURE: 76 MMHG | HEART RATE: 54 BPM

## 2023-11-29 DIAGNOSIS — Z13.29 SCREENING FOR HYPOTHYROIDISM: ICD-10-CM

## 2023-11-29 DIAGNOSIS — Z13.1 SCREENING FOR DIABETES MELLITUS: ICD-10-CM

## 2023-11-29 DIAGNOSIS — M54.6 ACUTE LEFT-SIDED THORACIC BACK PAIN: ICD-10-CM

## 2023-11-29 DIAGNOSIS — F90.9 ATTENTION DEFICIT HYPERACTIVITY DISORDER (ADHD), UNSPECIFIED ADHD TYPE: Primary | ICD-10-CM

## 2023-11-29 DIAGNOSIS — E78.49 OTHER HYPERLIPIDEMIA: ICD-10-CM

## 2023-11-29 PROCEDURE — 99395 PREV VISIT EST AGE 18-39: CPT | Performed by: INTERNAL MEDICINE

## 2023-11-29 RX ORDER — DEXTROAMPHETAMINE/AMPHETAMINE 15 MG
15 CAPSULE, EXT RELEASE 24 HR ORAL DAILY
COMMUNITY
Start: 2023-08-22

## 2023-11-29 NOTE — ASSESSMENT & PLAN NOTE
Left-sided thoracic back pain appears to be muscular in nature patient can utilize Advil or Aleve as per product insert for relief of discomfort also recommend low to medium heat applied for 20 minutes twice a day.   Minimize activities of lifting and carrying until symptoms have resolved

## 2023-11-29 NOTE — PROGRESS NOTES
Name: Rosa Hernandez      : 1995      MRN: [id number]  Encounter Provider: Reanna Tolentino MD  Encounter Date: 2023   Encounter department: [facility]    Assessment & Plan     1. Other hyperlipidemia  Assessment & Plan:  Last year's lipid profile reviewed a very mild elevation of LDL I have asked him to have a updated study performed and also reviewed foods to reduce in the diet in an effort to reduce his LDL value. Orders:  -     Lipid panel; Future    2. Screening for diabetes mellitus  -     Comprehensive metabolic panel; Future    3. Screening for hypothyroidism  -     TSH, 3rd generation with Free T4 reflex; Future    4. Attention deficit hyperactivity disorder (ADHD), unspecified ADHD type  Assessment & Plan:  Diagnosed with attention deficit hyperactivity disorder the patient is currently on Adderall extended release 15 mg daily with good response recommend continuation of this current dose. She would like us to take over prescribing of this medication which we are happy to do. Reviewed state regulations recommending monthly prescription of the medication only      5. Acute left-sided thoracic back pain  Assessment & Plan:  Left-sided thoracic back pain appears to be muscular in nature patient can utilize Advil or Aleve as per product insert for relief of discomfort also recommend low to medium heat applied for 20 minutes twice a day. Minimize activities of lifting and carrying until symptoms have resolved          Depression Screening and Follow-up Plan: Patient was screened for depression during today's encounter. They screened negative with a PHQ-2 score of 0. Subjective      -year-old gentleman returns to our office today for an annual complete physical examination. Since his last visit with us he has been diagnosed with attention deficit and is currently taking Adderall extended release 15 mg daily.   Indicates he does feel benefit from the medication. He is inquiring as to whether we can take over the prescribing of this medication which I will be happy to do. In addition the patient tells me that he has been  over the past year. His wife is a . The patient's only complaint on today's visit is some back discomfort which seems to be on the left side in the mid back region. He also indicates that he seems to have some degree of cold intolerance. Review of Systems   Endocrine: Positive for cold intolerance. Musculoskeletal:  Positive for back pain. All other systems reviewed and are negative. Current Outpatient Medications on File Prior to Visit   Medication Sig    ADDERALL XR, 15MG, 15 MG 24 hr capsule Take 15 mg by mouth daily       Objective     /76   Pulse (!) 54   Temp (!) 97.3 °F (36.3 °C)   Ht 5' 8.25" (1.734 m)   Wt 77.4 kg (170 lb 9.6 oz)   SpO2 99%   BMI 25.75 kg/m²     Physical Exam  Constitutional:       General: He is not in acute distress. Appearance: Normal appearance. He is not ill-appearing. HENT:      Head: Normocephalic. Right Ear: Tympanic membrane, ear canal and external ear normal.      Left Ear: Tympanic membrane, ear canal and external ear normal.      Nose: Nose normal.      Mouth/Throat:      Mouth: Mucous membranes are moist.      Pharynx: Oropharynx is clear. Eyes:      Extraocular Movements: Extraocular movements intact. Conjunctiva/sclera: Conjunctivae normal.      Pupils: Pupils are equal, round, and reactive to light. Neck:      Vascular: No carotid bruit. Cardiovascular:      Rate and Rhythm: Regular rhythm. Heart sounds: Normal heart sounds. Pulmonary:      Breath sounds: No wheezing, rhonchi or rales. Abdominal:      General: Abdomen is flat. Bowel sounds are normal. There is no distension. Palpations: There is no mass. Tenderness: There is no abdominal tenderness. There is no guarding.    Genitourinary:     Penis: Normal.       Testes: Normal.   Musculoskeletal:         General: Tenderness present. No swelling. Cervical back: Normal range of motion and neck supple. No rigidity or tenderness. Right lower leg: No edema. Left lower leg: No edema. Comments: Examination of the patient's back indicates mid thoracic/lumbar region on the left side to have some mild tenderness and tightness in the paravertebral musculature   Lymphadenopathy:      Cervical: No cervical adenopathy. Skin:     Coloration: Skin is not jaundiced or pale. Neurological:      General: No focal deficit present. Mental Status: He is alert. Mental status is at baseline. Psychiatric:         Mood and Affect: Mood normal.         Behavior: Behavior normal.         Thought Content:  Thought content normal.         Judgment: Judgment normal.       Olivia New MD

## 2023-11-29 NOTE — ASSESSMENT & PLAN NOTE
Last year's lipid profile reviewed a very mild elevation of LDL I have asked him to have a updated study performed and also reviewed foods to reduce in the diet in an effort to reduce his LDL value.

## 2023-11-29 NOTE — ASSESSMENT & PLAN NOTE
Diagnosed with attention deficit hyperactivity disorder the patient is currently on Adderall extended release 15 mg daily with good response recommend continuation of this current dose. She would like us to take over prescribing of this medication which we are happy to do.   Reviewed state regulations recommending monthly prescription of the medication only

## 2023-12-27 DIAGNOSIS — F90.9 ATTENTION DEFICIT HYPERACTIVITY DISORDER (ADHD), UNSPECIFIED ADHD TYPE: Primary | ICD-10-CM

## 2023-12-27 RX ORDER — DEXTROAMPHETAMINE/AMPHETAMINE 15 MG
15 CAPSULE, EXT RELEASE 24 HR ORAL DAILY
Qty: 30 CAPSULE | Refills: 0 | Status: SHIPPED | OUTPATIENT
Start: 2023-12-27

## 2024-01-26 DIAGNOSIS — F90.9 ATTENTION DEFICIT HYPERACTIVITY DISORDER (ADHD), UNSPECIFIED ADHD TYPE: ICD-10-CM

## 2024-01-26 RX ORDER — DEXTROAMPHETAMINE/AMPHETAMINE 15 MG
15 CAPSULE, EXT RELEASE 24 HR ORAL DAILY
Qty: 30 CAPSULE | Refills: 0 | Status: SHIPPED | OUTPATIENT
Start: 2024-01-26

## 2024-02-27 DIAGNOSIS — F90.9 ATTENTION DEFICIT HYPERACTIVITY DISORDER (ADHD), UNSPECIFIED ADHD TYPE: ICD-10-CM

## 2024-02-27 RX ORDER — DEXTROAMPHETAMINE/AMPHETAMINE 15 MG
15 CAPSULE, EXT RELEASE 24 HR ORAL DAILY
Qty: 30 CAPSULE | Refills: 0 | Status: SHIPPED | OUTPATIENT
Start: 2024-02-27

## 2024-02-27 NOTE — TELEPHONE ENCOUNTER
Requested medication(s) are due for refill today: Yes  Patient has already received a courtesy refill: No  Other reason request has been forwarded to provider:

## 2024-03-22 ENCOUNTER — TELEPHONE (OUTPATIENT)
Dept: INTERNAL MEDICINE CLINIC | Facility: CLINIC | Age: 29
End: 2024-03-22

## 2024-03-22 DIAGNOSIS — F90.9 ATTENTION DEFICIT HYPERACTIVITY DISORDER (ADHD), UNSPECIFIED ADHD TYPE: ICD-10-CM

## 2024-03-22 NOTE — TELEPHONE ENCOUNTER
Patient left voicemail in regards to filling his medication via mail order and for a 90 day supply.    Please [facility]   [facility].( waiting for response )         Hi, my name is Chucky Rice. Are you calling in regards to a prescription I have for the monthly supply of Adderall 15 milligram XR? I was going to inquire about moving towards a mail in or I guess the mail delivery option for a 90 day supply just given my work schedule and having to travel often. So I was just calling to see if I could talk to someone about that and if that would be possible. After I speak with my insurance, they told me just to give the office a call. If you could just reach out whenever you get a chance. Can reach me on my cell at [phone]. And again, this is Chucky Stafford. Phone number is [phone]. Thanks. Lul.

## 2024-03-25 RX ORDER — DEXTROAMPHETAMINE/AMPHETAMINE 15 MG
15 CAPSULE, EXT RELEASE 24 HR ORAL DAILY
Qty: 90 CAPSULE | Refills: 0 | Status: SHIPPED | OUTPATIENT
Start: 2024-03-25 | End: 2024-06-23

## 2024-03-28 ENCOUNTER — APPOINTMENT (OUTPATIENT)
Dept: LAB | Age: 29
End: 2024-03-28
Payer: COMMERCIAL

## 2024-03-28 DIAGNOSIS — Z13.29 SCREENING FOR HYPOTHYROIDISM: ICD-10-CM

## 2024-03-28 DIAGNOSIS — E78.49 OTHER HYPERLIPIDEMIA: ICD-10-CM

## 2024-03-28 DIAGNOSIS — Z13.1 SCREENING FOR DIABETES MELLITUS: ICD-10-CM

## 2024-03-28 LAB
ALBUMIN SERPL BCP-MCNC: 4.5 G/DL (ref 3.5–5)
ALP SERPL-CCNC: 59 U/L (ref 34–104)
ALT SERPL W P-5'-P-CCNC: 25 U/L (ref 7–52)
ANION GAP SERPL CALCULATED.3IONS-SCNC: 10 MMOL/L (ref 4–13)
AST SERPL W P-5'-P-CCNC: 20 U/L (ref 13–39)
BILIRUB SERPL-MCNC: 0.56 MG/DL (ref 0.2–1)
BUN SERPL-MCNC: 17 MG/DL (ref 5–25)
CALCIUM SERPL-MCNC: 9.6 MG/DL (ref 8.4–10.2)
CHLORIDE SERPL-SCNC: 103 MMOL/L (ref 96–108)
CHOLEST SERPL-MCNC: 169 MG/DL
CO2 SERPL-SCNC: 27 MMOL/L (ref 21–32)
CREAT SERPL-MCNC: 0.93 MG/DL (ref 0.6–1.3)
GFR SERPL CREATININE-BSD FRML MDRD: 109 ML/MIN/1.73SQ M
GLUCOSE P FAST SERPL-MCNC: 86 MG/DL (ref 65–99)
HDLC SERPL-MCNC: 56 MG/DL
LDLC SERPL CALC-MCNC: 103 MG/DL (ref 0–100)
NONHDLC SERPL-MCNC: 113 MG/DL
POTASSIUM SERPL-SCNC: 4 MMOL/L (ref 3.5–5.3)
PROT SERPL-MCNC: 6.9 G/DL (ref 6.4–8.4)
SODIUM SERPL-SCNC: 140 MMOL/L (ref 135–147)
TRIGL SERPL-MCNC: 52 MG/DL
TSH SERPL DL<=0.05 MIU/L-ACNC: 1.71 UIU/ML (ref 0.45–4.5)

## 2024-03-28 PROCEDURE — 84443 ASSAY THYROID STIM HORMONE: CPT

## 2024-03-28 PROCEDURE — 80053 COMPREHEN METABOLIC PANEL: CPT

## 2024-03-28 PROCEDURE — 80061 LIPID PANEL: CPT

## 2024-03-28 PROCEDURE — 36415 COLL VENOUS BLD VENIPUNCTURE: CPT

## 2024-07-03 DIAGNOSIS — F90.9 ATTENTION DEFICIT HYPERACTIVITY DISORDER (ADHD), UNSPECIFIED ADHD TYPE: ICD-10-CM

## 2024-07-03 RX ORDER — DEXTROAMPHETAMINE/AMPHETAMINE 15 MG
15 CAPSULE, EXT RELEASE 24 HR ORAL DAILY
Qty: 90 CAPSULE | Refills: 0 | Status: SHIPPED | OUTPATIENT
Start: 2024-07-03 | End: 2024-10-01

## 2024-09-03 ENCOUNTER — APPOINTMENT (OUTPATIENT)
Dept: RADIOLOGY | Facility: MEDICAL CENTER | Age: 29
End: 2024-09-03
Payer: COMMERCIAL

## 2024-09-03 ENCOUNTER — TELEPHONE (OUTPATIENT)
Age: 29
End: 2024-09-03

## 2024-09-03 ENCOUNTER — OFFICE VISIT (OUTPATIENT)
Dept: OBGYN CLINIC | Facility: MEDICAL CENTER | Age: 29
End: 2024-09-03
Payer: COMMERCIAL

## 2024-09-03 ENCOUNTER — APPOINTMENT (OUTPATIENT)
Age: 29
End: 2024-09-03
Payer: COMMERCIAL

## 2024-09-03 VITALS
BODY MASS INDEX: 25.31 KG/M2 | HEIGHT: 68 IN | DIASTOLIC BLOOD PRESSURE: 84 MMHG | SYSTOLIC BLOOD PRESSURE: 130 MMHG | HEART RATE: 58 BPM | WEIGHT: 167 LBS

## 2024-09-03 DIAGNOSIS — M25.561 RIGHT KNEE PAIN, UNSPECIFIED CHRONICITY: ICD-10-CM

## 2024-09-03 DIAGNOSIS — M25.561 RIGHT KNEE PAIN, UNSPECIFIED CHRONICITY: Primary | ICD-10-CM

## 2024-09-03 DIAGNOSIS — M23.91 INTERNAL DERANGEMENT OF RIGHT KNEE: ICD-10-CM

## 2024-09-03 PROCEDURE — 73564 X-RAY EXAM KNEE 4 OR MORE: CPT

## 2024-09-03 PROCEDURE — 99203 OFFICE O/P NEW LOW 30 MIN: CPT | Performed by: ORTHOPAEDIC SURGERY

## 2024-09-03 NOTE — TELEPHONE ENCOUNTER
Caller: Clara/[facility]    Doctor: Lester    Reason for call: order for xray states [facility]. Should be [place]. Ally will correct    Call back#: na

## 2024-09-03 NOTE — PATIENT INSTRUCTIONS
"MRI right knee   Strength training focusing on core and glutes   Yoga for stretching       Patellofemoral Pain Syndrome  Patellofemoral pain syndrome (PFPS) is a broad term used to describe pain in the front of the knee and around the patella, or kneecap. It is sometimes called \"runner's knee\" or \"jumper's knee\" because it is common in people who participate in sports -- especially females and young adults -- but PFPS can occur in nonathletes, as well.    The pain and stiffness caused by PFPS can make it difficult to climb stairs, kneel down, and perform other everyday activities.    Many things may contribute to the development of PFPS. Problems with the alignment of the kneecap and overuse from vigorous athletics or training are often significant factors.    Symptoms are often relieved with conservative treatment, such as changes in activity levels or a therapeutic exercise program.    Anatomy  The knee is the largest joint in your body and one of the most complex. It is made up of:  The lower end of the femur (thighbone)  The upper end of the tibia (shinbone)  The patella (kneecap)    Ligaments and tendons connect the femur to the bones of the lower leg. The four main ligaments in the knee attach to the bones and act like strong ropes to hold the bones together.    A healthy knee is made up of five main things: bones, cartilage, menisci, ligaments, and tendons.     Muscles are connected to bones by tendons.  The quadriceps tendon connects the muscles in the front of the thigh to the patella.  Segments of the quadriceps tendon -- called the patellar retinacula -- attach to the tibia and help to stabilize the patella.  Stretching from your patella to your tibia is the patellar tendon.    Several structures in the knee joint make movement easier. For example, the patella rests in a groove on the top of the femur called the trochlea. When you bend or straighten your knee, the patella moves back and forth inside this " "trochlear groove.    A slippery substance called articular cartilage covers the ends of the femur, the trochlear groove, and the underside of the patella. Articular cartilage helps your bones glide smoothly against each other as you move your leg.    Your knee cartilage is cushioned by menisci -- rubbery, C-shaped disks that act as \"shock absorbers\" between the thighbone and shinbone, helping to protect and stabilize the knee joints. Each knee has two menisci -- one on the outside of the knee (lateral) and one on the inside (medial). The medial and lateral menisci cushion the articular cartilage of the thigh and shin bones, but not the articular cartilage of your kneecap or the trochlear groove.    The menisci are rubbery disks that help cushion the knee joint.     Also aiding in movement of the knee is the synovium -- a thin lining of tissue that covers the surface of the joint. The synovium produces a small amount of fluid that lubricates the cartilage. In addition, just below the kneecap is a small pad of fat that cushions the kneecap and acts as a shock absorber.    (Left) The patella normally rests in a small groove at the end of the femur called the trochlear groove. (Right) As you bend and straighten your knee, the patella slides up and down within the groove.     Description  Patellofemoral pain syndrome occurs when nerves sense pain in the soft tissues and bone around the kneecap. These soft tissues include the tendons, the fat pad beneath the patella, and the synovial tissue that lines the knee joint.    In some cases of patellofemoral pain, a condition called chondromalacia patella is present. Chondromalacia patella is the softening and breakdown of the articular cartilage on the underside of the kneecap. There are no nerves in articular cartilage -- so damage to the cartilage itself cannot directly cause pain. It can, however, lead to inflammation of the synovium and pain in the underlying " bone.      Cause  Overuse  In many cases, PFPS is caused by vigorous physical activities that put repeated stress on the knee -- such as jogging, squatting, and climbing stairs.  It can also be caused by a sudden change in physical activity, which can be related to the frequency of activity (e.g., increasing the number of days you exercise each week) or to the duration or intensity of activity (e.g.,  running longer distances).    Other factors that may contribute to patellofemoral pain include:  Use of improper sports training techniques or equipment  Changes in footwear or playing surface (e.g., switching from a natural grass field to a field with artificial turf)    Patellar Malalignment  Patellofemoral pain syndrome can also be caused by abnormal tracking of the kneecap in the trochlear groove. In this condition, the patella is pushed out to one side of the groove when the knee is bent. This abnormality may cause increased pressure between the back of the patella and the trochlea, irritating soft tissues.  Factors that contribute to poor tracking of the kneecap include:  Problems with the alignment of the legs between the hips and the ankles, which may result in a kneecap that shifts too far toward the outside or inside of the leg, or one that rides too high in the trochlear groove -- a condition called patella radha.  Muscular imbalances or weaknesses, especially in the quadriceps muscles at the front of the thigh and the muscles that externally rotate and move the hip away from your body (this movement is called abduction). When the knee bends and straightens, the quadriceps muscles and tendon help to keep the kneecap centered within the trochlear groove, together with the hip muscles that help control the position of the thigh bone. Weak or imbalanced quadriceps and hip muscles can cause poor tracking of the kneecap within the groove.    (Left) In this MRI scan, the kneecap is normally aligned within the  trochlear groove (arrows). (Right) Here, the kneecap has shifted out of the groove and is pulled toward the outside of the leg ([place]).     Symptoms  The most common symptom of PFPS is a dull, aching pain in the front of the knee. This pain--which usually begins gradually and is frequently activity-related--may be present in one or both knees. Other common symptoms include:  Pain during exercise and activities that repeatedly bend the knee, such as climbing stairs, running, jumping, or squatting  Pain on the front of the knee after sitting for a long period of time with your knees bent, such as one does in a movie theater, in a car, or on an airplane  Pain related to a change in activity level or intensity, playing surface, or equipment  Popping or crackling sounds in your knee when climbing stairs or when standing up after prolonged sitting    Home Remedies  In many cases, patellofemoral pain will improve with simple home treatment.  Activity Changes  Stop doing the activities that make your knee hurt until your pain goes away. This may mean:  Changing your training routine  Switching to low-impact activities -- such as riding a stationary bike, using an elliptical machine, or swimming -- that will place less stress on your knee joint  If you are overweight, losing weight, which will also help to reduce pressure on your knee    The RICE Method  RICE stands for rest, ice, compression, and elevation.  Rest. Avoid putting weight on the painful knee.  Ice. Use cold packs for 20 minutes at a time, several times a day. Do not apply ice directly on skin.  Compression. To prevent additional swelling, lightly wrap the knee in an elastic bandage, leaving a hole in the area of the kneecap. Make sure that the bandage fits snugly and does not cause additional pain.  Elevation. As often as possible, rest with your knee raised up higher than your heart.    Medication  Nonsteroidal anti-inflammatory drugs (NSAIDs) such as  ibuprofen and naproxen can help reduce swelling and relieve pain.  If your pain persists or it becomes more difficult to move your knee, contact your doctor for a thorough evaluation.    Doctor Examination  Physical Examination  During the physical examination:  Your doctor will discuss your general health and the symptoms you are experiencing.   They will ask when your knee pain started, about the severity and nature of the pain (dull vs. sharp), and which activities make the pain worse.  To determine the exact location of the pain, the doctor may gently press and pull on the front of your knees and kneecaps.   They may also ask you to squat, jump, or lunge during the exam in order to test your knee and core body strength.    During the examination, your doctor will check your knee for problems in patellar tracking,   Reproduced with permission from MARY Womack, ed: Essentials of Musculoskeletal Care, ed 4. [place], [place], [employer], 2010.     To help diagnose the cause of your pain and to rule out any other physical problems, your doctor may also check:  Alignment of the lower leg and the position of the kneecap  Knee stability, hip rotation, and range of motion of knees and hips  The kneecap for signs of tenderness  The attachment of thigh muscles to the kneecap  Strength, flexibility, firmness, and tone of the hips, quadriceps (front thigh muscles), and hamstrings (back thigh muscles)  Tightness of the heel cord and flexibility of the feet    Finally, your doctor may ask you to walk back and forth in order to examine your gait (the way you walk) and look for problems with your gait that may be contributing to your knee pain.    Imaging Tests  X-rays. Usually, your doctor will be able to diagnose PFPS with just a physical examination. However, in most cases, they will also order an X-ray to rule out damage to the bones that make up the knee.    Magnetic resonance imaging (MRI) scans.  "An MRI scan provides clear images of the body’s soft tissues, such as ligaments, tendons, and muscles. Your doctor may order an MRI if, after a period of time, your symptoms do not improve with physical therapy and home exercise.    Treatment  Medical treatment for PFPS is designed to relieve pain and restore range of motion and strength. In most cases, patellofemoral pain can be treated nonsurgically.  Nonsurgical Treatment  In addition to activity changes, the RICE method, and anti-inflammatory medication, your doctor may recommend the following:    Physical therapy. Specific exercises will help you improve range of motion, strength, and endurance.  It is especially important to focus on strengthening and stretching your quadriceps and strengthening your hip muscles, since these muscles work together to stabilize your kneecap.    Core exercises may also be recommended to strengthen the muscles in your abdomen and lower back.    Orthotics. Shoe inserts can help align and stabilize your foot and ankle, taking stress off of your lower leg. Orthotics can either be custom-made for your foot or purchased \"off the shelf.\"      Shoe inserts take stress off your lower leg by aligning your foot and ankle.   Reproduced with permission from MARY Womack, ed: Essentials of Musculoskeletal Care, ed 4. [place], [place], [employer], 2010     Surgical Treatment  Surgical treatment for patellofemoral pain is very rarely needed and is performed only for severe cases that do not respond to nonsurgical treatment. Surgical treatments may include:    Arthroscopy. During arthroscopy, your surgeon inserts a small camera, called an arthroscope, into your knee joint. The camera displays pictures on a monitor, and your surgeon uses these images to guide surgical instruments.  Debridement. In some cases, removing damaged articular cartilage from the surface of the patella can provide pain relief.  Lateral release. If " the lateral retinaculum is tight enough to pull the patella out of the trochlear groove or tilt the patella, a lateral release procedure can loosen the tissue and correct the patellar malalignment.    This short surgical video demonstrates an arthroscopic lateral release as seen from inside the knee. A lateral release may rarely be performed as an isolated procedure, or as part of a larger surgery to treat a painful or unstable kneecap. Video courtesy of Charlie Ribeiro MD, FAAOS.      Tibial tubercle transfer. In some cases, it may be necessary to realign the kneecap to take stress off of damaged cartilage by moving the patellar tendon along with a portion of the tibial tubercle -- the bony prominence (bump) on the tibia (shinbone).    A traditional open surgical incision is required for this procedure. The doctor partially or totally detaches the tibial tubercle so that the bone and the tendon can be moved toward the inner side of the knee. The piece of bone is then reattached to the tibia using screws. In most cases, this transfer allows for better tracking of the kneecap in the trochlear groove.    Prevention  Patellofemoral pain syndrome is usually fully relieved with simple measures or physical therapy. It may come back, however, if you do not adjust your training routine or activity level.    It is essential to maintain appropriate conditioning of the muscles around the knee and hip, especially the quadriceps, hip abductor, and hip external rotator muscles.  There are additional steps that you can take to prevent patellofemoral knee pain from coming back. They include:  Wearing shoes appropriate to your activities  Warming up thoroughly before physical activity  Incorporating stretching and flexibility exercises for the quadriceps and hamstrings into your warm-up routine, and stretching after physical activity  Increasing training gradually  Reducing any activity that has hurt your knees in the  past  Maintaining a healthy body weight to avoid overstressing your knees    Meniscus Tears  Meniscus tears are among the most common knee injuries. Athletes, particularly those who play contact sports, are at risk for meniscus tears. However, anyone at any age can tear the meniscus. When people talk about torn cartilage in the knee, they are usually referring to a torn meniscus.    Anatomy  Two bones meet to form your knee joint: the femur and the tibia. The kneecap (patella) sits in front of the joint to provide some protection.  Two wedge-shaped pieces of fibrocartilage act as shock absorbers between your femur and tibia. These are the menisci. The menisci help to transmit weight from one bone to another and play an important role in knee stability.    Normal knee anatomy. The menisci are two rubbery disks that help cushion the knee joint.     Description  The meniscus can tear from acute trauma or as the result of degenerative changes that happen over time.  Tears are noted by how they look, as well as where the tear occurs in the meniscus. Common tears include bucket handle, flap, and radial.    Sports-related meniscus injuries often occur along with other knee injuries, such as anterior cruciate ligament (ACL) tears.    Types of meniscus tears:  (Left) Bucket handle tear. (Right) Flap tear.     (Left) Radial tear. (Right) Degenerative tear.     Cause  Acute meniscus tears often happen during sports. These can occur through either a contact or non-contact injury -- for example, a pivoting or cutting injury.  As people age, they are more likely to have degenerative meniscus tears. Aged, worn tissue is more prone to tears. An awkward twist when getting up from a chair may be enough to cause a tear in an aging meniscus.    Symptoms  You might feel a pop when you tear the meniscus. Most people can still walk on their injured knee, and many athletes are able to keep playing with a tear. Over 2 to 3 days, however,  "the knee will gradually become more stiff and swollen.  The most common symptoms of a meniscus tear are:  Pain  Stiffness and swelling  Catching or locking of your knee  The sensation of your knee giving way  Inability to move your knee through its full range of motion    Doctor Examination  Physical Examination  After discussing your symptoms and medical history, your doctor will examine your knee. They will check for tenderness along the joint line where the meniscus sits. This often signals a tear.    During the exam, your doctor will look for signs of tenderness along the joint line.     One of the main tests for meniscus tears is the Monse test. Your doctor will bend your knee, then straighten and rotate it. This puts tension on a torn meniscus. If you have a meniscus tear, this movement may cause pain, clicking, or a clunking sensation within the joint.    The Monse test (shown here) will help your doctor determine if you have a meniscus tear.     Imaging Tests  Because other knee injuries can cause similar symptoms, your doctor may order imaging tests to help confirm the diagnosis.  X-rays. X-rays provide images of dense structures, such as bone. Although an X-ray will not show a meniscus tear, your doctor may order one to look for other causes of knee pain, such as osteoarthritis.    Magnetic resonance imaging (MRI) scans.  An MRI scan assesses the soft tissues in your knee joint, including the menisci, cartilage, tendons, and ligaments.    MRI scans show (left) a normal meniscus and (right) a torn meniscus. The tear can be seen as a white line through the dark body of the meniscus.       Treatment  The treatment your doctor recommends will depend on a number of factors, including your age, symptoms, and activity level. They will also consider the type, size, and location of the injury.    The outer one-third of the meniscus has a rich blood supply. A tear in this \"red\" zone may heal on its own, or can " "often be repaired with surgery. A longitudinal tear is an example of this kind of tear.    In contrast, the inner two-thirds of the meniscus lacks a significant blood supply. Without nutrients from blood, tears in this \"white\" zone with limited blood flow cannot heal. Because the pieces cannot grow back together, symptomatic tears in this zone that do not respond to conservative treatment are usually trimmed surgically.    Nonsurgical Treatment  Many meniscus tears will not need immediate surgery. If your symptoms do not persist and you have no locking or swelling of the knee, your doctor may recommend nonsurgical treatment.    RICE. The RICE protocol is effective for most sports-related injuries. RICE stands for Rest, Ice, Compression, and Elevation.  Rest. Take a break from the activity that caused the injury. Your doctor may recommend that you use crutches to avoid putting weight on your leg.  Ice. Use cold packs for 20 minutes at a time, several times a day. Do not apply ice directly to the skin.  Compression. To prevent additional swelling and blood loss, wear an elastic compression bandage.  Elevation. To reduce swelling, recline when you rest, and put your leg up higher than your heart.    Nonsteroidal anti-inflammatory drugs (NSAIDs). Anti-inflammatory drugs such as aspirin, ibuprofen, and naproxen help reduce pain and swelling.  Steroid injection. Your doctor may inject a corticosteroid medication into your knee joint to help eliminate pain and swelling.  Other nonsurgical treatment. Biologics injections, such as platelet-rich plasma (PRP), are currently being studied and may show promise in the future for the treatment of meniscus tears.    Surgical Treatment  If your symptoms persist with nonsurgical treatment, your doctor may suggest arthroscopic surgery.  Procedure.  Knee arthroscopy is one of the most commonly performed surgical procedures. In this procedure, the surgeon inserts a miniature camera " through a small incision (portal) in the knee. This provides a clear view of the inside of the knee. The surgeon then inserts surgical instruments through two or three other small portals to trim or repair the tear.    Illustration and photo show a camera and instruments inserted through portals in a knee.     Partial meniscectomy.  In this procedure, the damaged meniscus tissue is trimmed away. This procedure typically allows for immediate weight bearing, and full range of motion soon after surgery.    Meniscus repair.  Some meniscus tears can be repaired by suturing (stitching) the torn pieces together. Whether a tear can be successfully repaired depends upon the type of tear, as well as the overall condition of the injured meniscus. Because the meniscus must heal back together, recovery time for a repair is longer than for a meniscectomy.    Close-up of partial meniscectomy     A torn meniscus repaired with sutures     Once the initial healing is complete, your doctor will prescribe rehabilitation exercises. Regular exercise to restore your knee mobility and strength is necessary. You will start with exercises to improve your range of motion. Strengthening exercises will gradually be added to your rehabilitation plan.    In many cases, rehabilitation can be carried out at home, although your doctor may recommend working with a physical therapist. Rehabilitation time for a meniscus repair is about 3 to 6 months. A meniscectomy requires less time for healing -- approximately 3 to 6 weeks.    Recovery  Meniscus tears are extremely common knee injuries. With proper diagnosis, treatment, and rehabilitation, patients often return to their pre-injury abilities.

## 2024-09-03 NOTE — PROGRESS NOTES
Ortho Sports Medicine Knee New Patient Visit     Assesment:   29 y.o. male right knee concern for medial meniscus tear vs hamstring tendonitis vs patellofemoral disorder    Plan:    The patient's x-rays were reviewed today. The patient is experiencing symptoms consistent with possible medial meniscus tear vs hamstring tendonitis vs patellofemoral disorder. An MRI study was ordered for further evaluation of the knee. It was discussed the patient will likely benefit from physical therapy pending results of his MRI study. The patient would benefit from quadriceps, hamstring, hip and core strengthening along with hamstring and quadriceps stretching. The patient is encouraged to maintain his healthy lifestyle with the addition of yoga. I will see the patient back upon completion of MRI study.       Conservative treatment:    Ice to knee for 20 minutes at least 1-2 times daily.  PT for ROM/strengthening to knee, hip and core. - referral not placed today, will be placed based upon MRI results   OTC NSAIDS prn for pain.    Imaging:    All imaging from today was reviewed by myself and explained to the patient.   We will obtain an MRI of the knee to rule out medial meniscus tear.      Injection:    No Injection planned at this time.      Surgery:     No surgery is recommended at this point, continue with conservative treatment plan as noted.      Follow up:    Return for f/u, MRI results, R, Knee.        Chief Complaint   Patient presents with   • Right Knee - Pain       History of Present Illness:    The patient is a 29 y.o. 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      Pain is located anterior and medially.  The patient rates the pain as a 3-4/10.  The pain has been present for 1 months.      The patient does not recall a specific injury. The pain is characterized as sharp, stabbing, dull, achy.  The  pain is present daily.      About 1 month ago around 7/31 or 8/1 the patient was playing basketball and his right knee slipped out from under him sliding forward and hyper extended his knee. The patient is active and also participates in [employer]. On 8/24/24 he was playing basketball without any evidence of injury. He began having a lot of pressure and stopped playing. He denies any swelling. He was having difficulty with stairs. This past Saturday he played basketball again because he was feeling good. He began having pressure again.     Pain is improved by rest, ice, and NSAIDS.  Pain is aggravated by stairs, running, and basketball.    Symptoms include popping.     The patient has tried rest, ice, and NSAIDS.            Knee Surgical History:  None    Past Medical, Social and Family History:  History reviewed. No pertinent past medical history.  History reviewed. No pertinent surgical history.  No Known Allergies  Current Outpatient Medications on File Prior to Visit   Medication Sig Dispense Refill   • ADDERALL XR, 15MG, 15 MG 24 hr capsule Take 1 capsule (15 mg total) by mouth daily Max Daily Amount: 15 mg 90 capsule 0     No current facility-administered medications on file prior to visit.     Social History     Socioeconomic History   • Marital status: Single     Spouse name: Not on file   • Number of children: 0   • Years of education: Not on file   • Highest education level: Not on file   Occupational History   • Not on file   Tobacco Use   • Smoking status: Never     Passive exposure: Never   • Smokeless tobacco: Never   Substance and Sexual Activity   • Alcohol use: Yes     Comment: occasional    • Drug use: No   • Sexual activity: Yes   Other Topics Concern   • Not on file   Social History Narrative    Hand dominance: R     Social Determinants of Health     Financial Resource Strain: Not on file   Food Insecurity: Not on file   Transportation Needs: Not on file   Physical Activity: Not on file   Stress:  "Not on file   Social Connections: Unknown (6/18/2024)    Received from [facility]    • How often do you feel lonely or isolated from those around you? (Adult - for ages 18 years and over): Not on file   Intimate Partner Violence: Not on file   Housing Stability: Not on file         I have reviewed the past medical, surgical, social and family history, medications and allergies as documented in the EMR.    Review of systems: ROS is negative other than that noted in the HPI.  Constitutional: Negative for fatigue and fever.   HENT: Negative for sore throat.    Respiratory: Negative for shortness of breath.    Cardiovascular: Negative for chest pain.   Gastrointestinal: Negative for abdominal pain.   Endocrine: Negative for cold intolerance and heat intolerance.   Genitourinary: Negative for flank pain.   Musculoskeletal: Negative for back pain.   Skin: Negative for rash.   Allergic/Immunologic: Negative for immunocompromised state.   Neurological: Negative for dizziness.   Psychiatric/Behavioral: Negative for agitation.     Physical Exam:    Blood pressure 130/84, pulse 58, height 5' 8.25\" (1.734 m), weight 75.8 kg (167 lb).    General/Constitutional: NAD, well developed, well nourished  HENT: Normocephalic, atraumatic  CV: Intact distal pulses, regular rate  Resp: No respiratory distress or labored breathing  GI: Soft and non-tender   Lymphatic: No lymphadenopathy palpated  Neuro: Alert and Oriented x 3, no focal deficits  Psych: Normal mood, normal affect, normal judgement, normal behavior  Skin: Warm, dry, no rashes, no erythema      Knee Exam (focused):                RIGHT LEFT   ROM:   0-130 0-130   Palpation: Effusion negative negative     MJL tenderness equivocal Negative     LJL tenderness Negative Negative    Semimembranosus Positive Not tested   Meniscus: Monse Positive Not Applicable    Apley's Compression Positive Not Applicable   Instability: Varus stable stable     Valgus stable " stable   Special Tests: Lachman Negative Negative     Posterior drawer Negative Not Applicable     Anterior drawer Negative Not Applicable     Pivot shift not tested not tested     Dial not tested not tested   Patella: Palpation no tenderness no tenderness     Mobility 1/4 1/4     Apprehension Negative Not Applicable   Other: Single leg 1/4 squat weakness     not tested      Hamstring tightness to roughly 70 degrees  Quadriceps tightness approximately 3 inches from buttocks     LE NV Exam: +2 DP/PT pulses bilaterally  Sensation intact to light touch L2-S1 bilaterally     Bilateral hip ROM demonstrates no pain actively or passively    No calf tenderness to palpation bilaterally    Knee Imaging    X-rays of the right knee were reviewed, which demonstrate well preserved joint spaces. No acute osseous abnormality or fracture.  I have reviewed the radiology report and do not currently have a radiology reading from [facility], but will check the result once the reading is performed.      Scribe Attestation    I,:  Stephany Montez am acting as a scribe while in the presence of the attending physician.:       I,:  Sherif Batista, DO personally performed the services described in this documentation    as scribed in my presence.:

## 2024-09-09 ENCOUNTER — HOSPITAL ENCOUNTER (OUTPATIENT)
Dept: RADIOLOGY | Facility: IMAGING CENTER | Age: 29
Discharge: HOME | End: 2024-09-09
Attending: ORTHOPAEDIC SURGERY
Payer: COMMERCIAL

## 2024-09-09 DIAGNOSIS — M23.91 INTERNAL DERANGEMENT OF RIGHT KNEE: ICD-10-CM

## 2024-09-09 PROCEDURE — 73721 MRI JNT OF LWR EXTRE W/O DYE: CPT

## 2024-09-16 VITALS
SYSTOLIC BLOOD PRESSURE: 123 MMHG | HEART RATE: 62 BPM | HEIGHT: 68 IN | BODY MASS INDEX: 25.31 KG/M2 | WEIGHT: 167 LBS | DIASTOLIC BLOOD PRESSURE: 77 MMHG

## 2024-09-16 DIAGNOSIS — M23.91 INTERNAL DERANGEMENT OF RIGHT KNEE: ICD-10-CM

## 2024-09-16 DIAGNOSIS — M25.561 RIGHT KNEE PAIN, UNSPECIFIED CHRONICITY: Primary | ICD-10-CM

## 2024-09-16 PROCEDURE — 99213 OFFICE O/P EST LOW 20 MIN: CPT | Performed by: ORTHOPAEDIC SURGERY

## 2024-09-16 NOTE — PROGRESS NOTES
Ortho Sports Medicine Knee Follow Up Visit     Assesment:     29 y.o. male right knee mild muscle strain. MRI results ruled out meniscus tear. He does have tight hamstring and quads.    Plan:    He can return to activity as tolerated. 4-point Hip exercises sheet provided. He could attend physical therapy in the future if the symptoms return.    Conservative treatment:    Let pain guide gradual return activities.    Imaging:    All imaging from today was reviewed by myself and explained to the patient.       Injection:    No Injection planned at this time.      Surgery:     No surgery is recommended at this point, continue with conservative treatment plan as noted.    Follow up:    Return if symptoms worsen or fail to improve.        Chief Complaint   Patient presents with    Right Knee - Follow-up     mri       History of Present Illness:    The patient is returns for follow up of right knee pain.  Since the prior visit, He reports significant improvement. He notes mild tenderness with a deep squat. He has not attempted to return to sport at this time. Overall, he is feeling better.    Pain is located medial.     Pain is improved by rest, ice  and NSAIDS.  Pain is aggravated by weight bearing and walking     The patient has tried rest, ice, and NSAIDS.          Knee Surgical History:  None    Past Medical, Social and Family History:  History reviewed. No pertinent past medical history.  History reviewed. No pertinent surgical history.  No Known Allergies  Current Outpatient Medications on File Prior to Visit   Medication Sig Dispense Refill    ADDERALL XR, 15MG, 15 MG 24 hr capsule Take 1 capsule (15 mg total) by mouth daily Max Daily Amount: 15 mg 90 capsule 0     No current facility-administered medications on file prior to visit.     Social History     Socioeconomic History    Marital status: Single     Spouse name: Not on file    Number of children: 0    Years of education: Not on file    Highest education  "level: Not on file   Occupational History    Not on file   Tobacco Use    Smoking status: Never     Passive exposure: Never    Smokeless tobacco: Never   Substance and Sexual Activity    Alcohol use: Yes     Comment: occasional     Drug use: No    Sexual activity: Yes   Other Topics Concern    Not on file   Social History Narrative    Hand dominance: R     Social Determinants of Health     Financial Resource Strain: Not on file   Food Insecurity: Not on file   Transportation Needs: Not on file   Physical Activity: Not on file   Stress: Not on file   Social Connections: Unknown (6/18/2024)    Received from [employer]     How often do you feel lonely or isolated from those around you? (Adult - for ages 18 years and over): Not on file   Intimate Partner Violence: Not on file   Housing Stability: Not on file         I have reviewed the past medical, surgical, social and family history, medications and allergies as documented in the EMR.    Review of systems: ROS is negative other than that noted in the HPI.  Constitutional: Negative for fatigue and fever.      Physical Exam:    Blood pressure 123/77, pulse 62, height 5' 8\" (1.727 m), weight 75.8 kg (167 lb).    General/Constitutional: NAD, well developed, well nourished  HENT: Normocephalic, atraumatic  CV: Intact distal pulses, regular rate  Resp: No respiratory distress or labored breathing  GI: Soft and non-tender   Lymphatic: No lymphadenopathy palpated  Neuro: Alert and Oriented x 3, no focal deficits  Psych: Normal mood, normal affect, normal judgement, normal behavior  Skin: Warm, dry, no rashes, no erythema      Knee Exam (focused):               RIGHT LEFT   ROM:   0-130 0-130   Palpation: Effusion negative negative     MJL tenderness Negative Negative     LJL tenderness Negative Negative   Meniscus: Monse Negative Negative    Apley's Compression Negative Negative   Instability: Varus stable stable     Valgus stable stable   Special " Tests: Lachman Negative Negative     Posterior drawer Negative Negative     Anterior drawer Negative Negative     Pivot shift not tested not tested     Dial not tested not tested   Patella: Palpation no tenderness no tenderness     Mobility 1/4 1/4     Apprehension Negative Not Applicable   Other: Single leg 1/4 squat good stability good stability      Hamstring tightness to roughly 70 degrees  Quadriceps tightness approximately 3 inches from buttocks     LE NV Exam: +2 DP/PT pulses bilaterally  Sensation intact to light touch L2-S1 bilaterally    No calf tenderness to palpation bilaterally      Knee Imaging    MRI of left knee obtained on 9/9/2024 were reviewed and demonstrate  Normal MRI with menisci intact and normal semimembranosus tendon .        Scribe Attestation      I,:  Ruthann Bhandari am acting as a scribe while in the presence of the attending physician.:       I,:  Sherif Batista DO personally performed the services described in this documentation    as scribed in my presence.:

## 2024-10-08 DIAGNOSIS — F90.9 ATTENTION DEFICIT HYPERACTIVITY DISORDER (ADHD), UNSPECIFIED ADHD TYPE: ICD-10-CM

## 2024-10-08 RX ORDER — DEXTROAMPHETAMINE/AMPHETAMINE 15 MG
15 CAPSULE, EXT RELEASE 24 HR ORAL DAILY
Qty: 90 CAPSULE | Refills: 0 | Status: SHIPPED | OUTPATIENT
Start: 2024-10-08 | End: 2025-01-06

## 2024-12-02 ENCOUNTER — OFFICE VISIT (OUTPATIENT)
Age: 29
End: 2024-12-02
Payer: COMMERCIAL

## 2024-12-02 VITALS
TEMPERATURE: 97.4 F | HEIGHT: 68 IN | OXYGEN SATURATION: 100 % | DIASTOLIC BLOOD PRESSURE: 74 MMHG | SYSTOLIC BLOOD PRESSURE: 116 MMHG | HEART RATE: 63 BPM | BODY MASS INDEX: 25.7 KG/M2 | WEIGHT: 169.6 LBS

## 2024-12-02 DIAGNOSIS — F90.9 ATTENTION DEFICIT HYPERACTIVITY DISORDER (ADHD), UNSPECIFIED ADHD TYPE: ICD-10-CM

## 2024-12-02 DIAGNOSIS — Z00.00 HEALTHCARE MAINTENANCE: Primary | ICD-10-CM

## 2024-12-02 DIAGNOSIS — E78.49 OTHER HYPERLIPIDEMIA: ICD-10-CM

## 2024-12-02 DIAGNOSIS — R01.1 HEART MURMUR: ICD-10-CM

## 2024-12-02 PROBLEM — M54.6 ACUTE LEFT-SIDED THORACIC BACK PAIN: Status: RESOLVED | Noted: 2023-11-29 | Resolved: 2024-12-02

## 2024-12-02 PROCEDURE — 99395 PREV VISIT EST AGE 18-39: CPT | Performed by: INTERNAL MEDICINE

## 2024-12-02 NOTE — ASSESSMENT & PLAN NOTE
On today's assessment of the patient I have examined him and find him to be in good general health.  In addition we reviewed his most recent blood work.  Recommend continuation of healthy lifestyle with healthy balanced diet and regular exercise.  Follow-up examination in 1 year is advised

## 2024-12-02 NOTE — ASSESSMENT & PLAN NOTE
Very mild soft murmur continues no intent increase in intensity recommend continued surveillance

## 2024-12-02 NOTE — PROGRESS NOTES
Name: Stephane Hoang      : 1995      MRN: [id number]  Encounter Provider: Jose Elias Pedroza MD  Encounter Date: 2024   Encounter department: [facility]    Assessment & Plan  Attention deficit hyperactivity disorder (ADHD), unspecified ADHD type  Current attention deficit symptoms are well-managed with Adderall 15 mg daily extended release continue current therapy no apparent side effects         Healthcare maintenance  On today's assessment of the patient I have examined him and find him to be in good general health.  In addition we reviewed his most recent blood work.  Recommend continuation of healthy lifestyle with healthy balanced diet and regular exercise.  Follow-up examination in 1 year is advised         Heart murmur  Very mild soft murmur continues no intent increase in intensity recommend continued surveillance         Other hyperlipidemia  Lipid profile reviewed shows improvement over the past several years continue low-cholesterol diet and regular exercise follow-up testing 1 year              History of Present Illness     This 29-year-old gentleman returns to our office for his annual physical examination.  He presents with no physical complaints.  He has a history of attention deficit and continues on Adderall medication which has been effective at helping to correct his attention deficit symptoms.      Review of Systems   All other systems reviewed and are negative.    No past medical history on file.  No past surgical history on file.  No family history on file.  Social History     Tobacco Use    Smoking status: Never     Passive exposure: Never    Smokeless tobacco: Never   Vaping Use    Vaping status: Never Used   Substance and Sexual Activity    Alcohol use: Yes     Comment: occasional     Drug use: No    Sexual activity: Yes     Current Outpatient Medications on File Prior to Visit   Medication Sig    ADDERALL XR, 15MG, 15 MG 24 hr capsule Take  "1 capsule (15 mg total) by mouth daily Max Daily Amount: 15 mg     No Known Allergies  Immunization History   Administered Date(s) Administered    DTaP 5 1995, 1995, 1994, 08/18/1995    Hep B, Adolescent or Pediatric 1994, 1994, 1994    Hib (HbOC) 08/18/1995    IPV 1994, 1994, 1994    Meningococcal Polysaccharide (MPSV4) 08/16/2012    Tdap 02/20/2006, 03/14/2016     Objective   /74   Pulse 63   Temp (!) 97.4 °F (36.3 °C) (Tympanic)   Ht 5' 8\" (1.727 m)   Wt 76.9 kg (169 lb 9.6 oz)   SpO2 100%   BMI 25.79 kg/m²     Physical Exam  Vitals and nursing note reviewed.   Constitutional:       General: He is not in acute distress.     Appearance: He is well-developed.   HENT:      Head: Normocephalic and atraumatic.      Right Ear: Tympanic membrane, ear canal and external ear normal.      Left Ear: Tympanic membrane, ear canal and external ear normal.      Nose: Nose normal.      Mouth/Throat:      Mouth: Mucous membranes are moist.      Pharynx: Oropharynx is clear.   Eyes:      Conjunctiva/sclera: Conjunctivae normal.      Pupils: Pupils are equal, round, and reactive to light.   Neck:      Vascular: No carotid bruit.   Cardiovascular:      Rate and Rhythm: Normal rate and regular rhythm.      Heart sounds: Murmur heard.   Pulmonary:      Effort: Pulmonary effort is normal. No respiratory distress.      Breath sounds: Normal breath sounds. No wheezing, rhonchi or rales.   Abdominal:      General: Abdomen is flat. Bowel sounds are normal. There is no distension.      Palpations: Abdomen is soft. There is no mass.      Tenderness: There is no abdominal tenderness. There is no guarding.   Genitourinary:     Penis: Normal.       Testes: Normal.      Comments: No indication of inguinal hernias  Musculoskeletal:      Cervical back: Normal range of motion and neck supple. No rigidity or tenderness.      Right lower leg: No edema.      Left lower leg: No edema. "   Lymphadenopathy:      Cervical: No cervical adenopathy.   Skin:     General: Skin is warm and dry.      Capillary Refill: Capillary refill takes less than 2 seconds.      Coloration: Skin is not jaundiced.   Neurological:      General: No focal deficit present.      Mental Status: He is alert. Mental status is at baseline.   Psychiatric:         Mood and Affect: Mood normal.         Behavior: Behavior normal.         Thought Content: Thought content normal.         Judgment: Judgment normal.

## 2024-12-02 NOTE — ASSESSMENT & PLAN NOTE
Current attention deficit symptoms are well-managed with Adderall 15 mg daily extended release continue current therapy no apparent side effects

## 2024-12-02 NOTE — ASSESSMENT & PLAN NOTE
Lipid profile reviewed shows improvement over the past several years continue low-cholesterol diet and regular exercise follow-up testing 1 year

## 2025-01-01 PROBLEM — Z00.00 HEALTHCARE MAINTENANCE: Status: RESOLVED | Noted: 2024-12-02 | Resolved: 2025-01-01

## 2025-01-20 DIAGNOSIS — F90.9 ATTENTION DEFICIT HYPERACTIVITY DISORDER (ADHD), UNSPECIFIED ADHD TYPE: ICD-10-CM

## 2025-01-21 RX ORDER — DEXTROAMPHETAMINE/AMPHETAMINE 15 MG
15 CAPSULE, EXT RELEASE 24 HR ORAL DAILY
Qty: 90 CAPSULE | Refills: 0 | Status: SHIPPED | OUTPATIENT
Start: 2025-01-21 | End: 2025-01-24

## 2025-01-22 ENCOUNTER — TELEPHONE (OUTPATIENT)
Age: 30
End: 2025-01-22

## 2025-01-22 NOTE — TELEPHONE ENCOUNTER
PA for ADDERALL SUBMITTED to [facility]    via    []CMM-KEY:   []Surescripts-Case ID #   [x]Availity-Auth ID # [id number] NDC # [id number]  []Faxed to plan   []Other website   []Phone call Case ID #     [x]PA sent as URGENT    All office notes, labs and other pertaining documents and studies sent. Clinical questions answered. Awaiting determination from insurance company.     Turnaround time for your insurance to make a decision on your Prior Authorization can take 7-21 business days.

## 2025-01-23 NOTE — TELEPHONE ENCOUNTER
Patient called rx refill line inquiring status on his Adderall request. Patient informed that a prior authorization was submitted 1/22/25, to allow 7-21 business days to receive a response. Noted PA was sent as Urgent. Understanding verbalized.

## 2025-01-24 DIAGNOSIS — F90.9 ATTENTION DEFICIT HYPERACTIVITY DISORDER (ADHD), UNSPECIFIED ADHD TYPE: Primary | ICD-10-CM

## 2025-01-24 RX ORDER — DEXTROAMPHETAMINE SACCHARATE, AMPHETAMINE ASPARTATE MONOHYDRATE, DEXTROAMPHETAMINE SULFATE AND AMPHETAMINE SULFATE 3.75; 3.75; 3.75; 3.75 MG/1; MG/1; MG/1; MG/1
15 CAPSULE, EXTENDED RELEASE ORAL EVERY MORNING
Qty: 90 CAPSULE | Refills: 0 | Status: SHIPPED | OUTPATIENT
Start: 2025-01-24

## 2025-01-24 NOTE — TELEPHONE ENCOUNTER
PA for ADDERALL APPROVED     Date(s) approved 11/22/24-1/21/26    Case #    Patient advised by          [x][employer] Message  []Phone call   [x]LMOM  []L/M to call office as no active Communication consent on file  []Unable to leave detailed message as VM not approved on Communication consent       Pharmacy advised by    [x]Fax  []Phone call    Approval letter scanned into Media Yes

## 2025-05-05 DIAGNOSIS — F90.9 ATTENTION DEFICIT HYPERACTIVITY DISORDER (ADHD), UNSPECIFIED ADHD TYPE: ICD-10-CM

## 2025-05-05 RX ORDER — DEXTROAMPHETAMINE SACCHARATE, AMPHETAMINE ASPARTATE MONOHYDRATE, DEXTROAMPHETAMINE SULFATE AND AMPHETAMINE SULFATE 3.75; 3.75; 3.75; 3.75 MG/1; MG/1; MG/1; MG/1
15 CAPSULE, EXTENDED RELEASE ORAL EVERY MORNING
Qty: 30 CAPSULE | Refills: 0 | Status: SHIPPED | OUTPATIENT
Start: 2025-05-05 | End: 2025-05-06

## 2025-05-05 NOTE — TELEPHONE ENCOUNTER
Reason for call:   [x] Refill   [] Prior Auth  [] Other:     Office:   [x] PCP/Provider - Jose Elias Pedroza MD / [facility] [facility]   [] Specialty/Provider -     Medication: amphetamine-dextroamphetamine (ADDERALL XR, 15MG,) 15 MG 24 hr capsule      Dose/Frequency:  Take 1 capsule (15 mg total) by mouth every morning     Quantity: 30    Pharmacy: [id number] - [place], [place] - [address]   Does the patient have enough for 3 days?   [] Yes   [x] No - Send as HP to POD    Mail Away Pharmacy   Does the patient have enough for 10 days?   [] Yes   [] No - Send as HP to POD

## 2025-05-06 DIAGNOSIS — F90.9 ATTENTION DEFICIT HYPERACTIVITY DISORDER (ADHD), UNSPECIFIED ADHD TYPE: ICD-10-CM

## 2025-05-06 RX ORDER — DEXTROAMPHETAMINE SACCHARATE, AMPHETAMINE ASPARTATE MONOHYDRATE, DEXTROAMPHETAMINE SULFATE AND AMPHETAMINE SULFATE 3.75; 3.75; 3.75; 3.75 MG/1; MG/1; MG/1; MG/1
15 CAPSULE, EXTENDED RELEASE ORAL EVERY MORNING
Qty: 30 CAPSULE | Refills: 0 | Status: SHIPPED | OUTPATIENT
Start: 2025-05-06

## 2025-05-06 NOTE — TELEPHONE ENCOUNTER
NOT A DUPLICATE:  Patient states was informed by Mary that they no longer accept his insurance and can't fill medication. Patient requesting script re-routed to [facility] pharmacy.     Reason for call:   [x] Refill   [] Prior Auth  [] Other:     Office:   [x] PCP/Provider -   [] Specialty/Provider -     Medication: amphetamine-dextroamphetamine (ADDERALL XR, 15MG,) 15 MG      Dose/Frequency:  Take 1 capsule (15 mg total) by mouth every morning     Quantity: 30    Pharmacy: [id number] - [place], [place] - [address]   Does the patient have enough for 3 days?   [] Yes   [x] No - Send as HP to POD    Mail Away Pharmacy   Does the patient have enough for 10 days?   [] Yes   [] No - Send as HP to POD

## 2025-06-03 DIAGNOSIS — F90.9 ATTENTION DEFICIT HYPERACTIVITY DISORDER (ADHD), UNSPECIFIED ADHD TYPE: ICD-10-CM

## 2025-06-04 RX ORDER — DEXTROAMPHETAMINE SACCHARATE, AMPHETAMINE ASPARTATE MONOHYDRATE, DEXTROAMPHETAMINE SULFATE AND AMPHETAMINE SULFATE 3.75; 3.75; 3.75; 3.75 MG/1; MG/1; MG/1; MG/1
15 CAPSULE, EXTENDED RELEASE ORAL EVERY MORNING
Qty: 30 CAPSULE | Refills: 0 | Status: SHIPPED | OUTPATIENT
Start: 2025-06-04

## 2025-07-01 DIAGNOSIS — F90.9 ATTENTION DEFICIT HYPERACTIVITY DISORDER (ADHD), UNSPECIFIED ADHD TYPE: ICD-10-CM

## 2025-07-02 RX ORDER — DEXTROAMPHETAMINE SACCHARATE, AMPHETAMINE ASPARTATE MONOHYDRATE, DEXTROAMPHETAMINE SULFATE AND AMPHETAMINE SULFATE 3.75; 3.75; 3.75; 3.75 MG/1; MG/1; MG/1; MG/1
15 CAPSULE, EXTENDED RELEASE ORAL EVERY MORNING
Qty: 30 CAPSULE | Refills: 0 | Status: SHIPPED | OUTPATIENT
Start: 2025-07-02

## 2025-07-02 NOTE — TELEPHONE ENCOUNTER
Requested medication(s) are due for refill today: Yes  Patient has already received a courtesy refill: No  Other reason request has been forwarded to provider:    normal balance

## 2025-07-30 DIAGNOSIS — F90.9 ATTENTION DEFICIT HYPERACTIVITY DISORDER (ADHD), UNSPECIFIED ADHD TYPE: ICD-10-CM

## 2025-07-31 RX ORDER — DEXTROAMPHETAMINE SACCHARATE, AMPHETAMINE ASPARTATE MONOHYDRATE, DEXTROAMPHETAMINE SULFATE AND AMPHETAMINE SULFATE 3.75; 3.75; 3.75; 3.75 MG/1; MG/1; MG/1; MG/1
15 CAPSULE, EXTENDED RELEASE ORAL EVERY MORNING
Qty: 30 CAPSULE | Refills: 0 | Status: SHIPPED | OUTPATIENT
Start: 2025-07-31